# Patient Record
Sex: FEMALE | Race: WHITE | NOT HISPANIC OR LATINO | Employment: FULL TIME | URBAN - NONMETROPOLITAN AREA
[De-identification: names, ages, dates, MRNs, and addresses within clinical notes are randomized per-mention and may not be internally consistent; named-entity substitution may affect disease eponyms.]

---

## 2020-05-18 ENCOUNTER — HOSPITAL ENCOUNTER (EMERGENCY)
Facility: HOSPITAL | Age: 30
Discharge: HOME | End: 2020-05-19
Attending: EMERGENCY MEDICINE | Admitting: EMERGENCY MEDICINE

## 2020-05-18 ENCOUNTER — APPOINTMENT (OUTPATIENT)
Dept: GENERAL RADIOLOGY | Facility: HOSPITAL | Age: 30
End: 2020-05-18

## 2020-05-18 DIAGNOSIS — S63.259A FINGER DISLOCATION, INITIAL ENCOUNTER: Primary | ICD-10-CM

## 2020-05-18 PROCEDURE — 63710000001 ONDANSETRON ODT 4 MG TABLET DISPERSIBLE: Performed by: EMERGENCY MEDICINE

## 2020-05-18 PROCEDURE — 73140 X-RAY EXAM OF FINGER(S): CPT

## 2020-05-18 PROCEDURE — 99283 EMERGENCY DEPT VISIT LOW MDM: CPT

## 2020-05-18 RX ORDER — HYDROCODONE BITARTRATE AND ACETAMINOPHEN 7.5; 325 MG/1; MG/1
1 TABLET ORAL ONCE
Status: COMPLETED | OUTPATIENT
Start: 2020-05-18 | End: 2020-05-18

## 2020-05-18 RX ORDER — ONDANSETRON 4 MG/1
4 TABLET, ORALLY DISINTEGRATING ORAL ONCE
Status: COMPLETED | OUTPATIENT
Start: 2020-05-18 | End: 2020-05-18

## 2020-05-18 RX ADMIN — ONDANSETRON 4 MG: 4 TABLET, ORALLY DISINTEGRATING ORAL at 23:30

## 2020-05-18 RX ADMIN — HYDROCODONE BITARTRATE AND ACETAMINOPHEN 1 TABLET: 7.5; 325 TABLET ORAL at 23:30

## 2020-05-19 ENCOUNTER — APPOINTMENT (OUTPATIENT)
Dept: GENERAL RADIOLOGY | Facility: HOSPITAL | Age: 30
End: 2020-05-19

## 2020-05-19 VITALS
HEIGHT: 64 IN | BODY MASS INDEX: 27.31 KG/M2 | OXYGEN SATURATION: 100 % | TEMPERATURE: 98.6 F | RESPIRATION RATE: 16 BRPM | DIASTOLIC BLOOD PRESSURE: 85 MMHG | SYSTOLIC BLOOD PRESSURE: 121 MMHG | HEART RATE: 82 BPM | WEIGHT: 160 LBS

## 2020-05-19 PROCEDURE — 73140 X-RAY EXAM OF FINGER(S): CPT

## 2020-10-03 ENCOUNTER — HOSPITAL ENCOUNTER (EMERGENCY)
Facility: HOSPITAL | Age: 30
Discharge: HOME | End: 2020-10-03
Attending: FAMILY MEDICINE | Admitting: FAMILY MEDICINE

## 2020-10-03 ENCOUNTER — APPOINTMENT (OUTPATIENT)
Dept: GENERAL RADIOLOGY | Facility: HOSPITAL | Age: 30
End: 2020-10-03

## 2020-10-03 ENCOUNTER — APPOINTMENT (OUTPATIENT)
Dept: CT IMAGING | Facility: HOSPITAL | Age: 30
End: 2020-10-03

## 2020-10-03 VITALS
SYSTOLIC BLOOD PRESSURE: 99 MMHG | HEART RATE: 70 BPM | DIASTOLIC BLOOD PRESSURE: 57 MMHG | WEIGHT: 153 LBS | RESPIRATION RATE: 18 BRPM | OXYGEN SATURATION: 100 % | TEMPERATURE: 98.8 F | BODY MASS INDEX: 26.12 KG/M2 | HEIGHT: 64 IN

## 2020-10-03 DIAGNOSIS — R42 EPISODIC LIGHTHEADEDNESS: Primary | ICD-10-CM

## 2020-10-03 LAB
ALBUMIN SERPL-MCNC: 5.4 G/DL (ref 3.5–5.2)
ALBUMIN/GLOB SERPL: 1.9 G/DL
ALP SERPL-CCNC: 94 U/L (ref 39–117)
ALT SERPL W P-5'-P-CCNC: 10 U/L (ref 1–33)
ANION GAP SERPL CALCULATED.3IONS-SCNC: 12.4 MMOL/L (ref 5–15)
AST SERPL-CCNC: 14 U/L (ref 1–32)
B-HCG UR QL: NEGATIVE
BACTERIA UR QL AUTO: ABNORMAL /HPF
BASOPHILS # BLD AUTO: 0.07 10*3/MM3 (ref 0–0.2)
BASOPHILS NFR BLD AUTO: 0.8 % (ref 0–1.5)
BILIRUB SERPL-MCNC: 0.4 MG/DL (ref 0–1.2)
BILIRUB UR QL STRIP: ABNORMAL
BUN SERPL-MCNC: 10 MG/DL (ref 6–20)
BUN/CREAT SERPL: 11.4 (ref 7–25)
CALCIUM SPEC-SCNC: 10.3 MG/DL (ref 8.6–10.5)
CHLORIDE SERPL-SCNC: 102 MMOL/L (ref 98–107)
CK SERPL-CCNC: 53 U/L (ref 20–180)
CLARITY UR: ABNORMAL
CO2 SERPL-SCNC: 25.6 MMOL/L (ref 22–29)
COLOR UR: ABNORMAL
CREAT SERPL-MCNC: 0.88 MG/DL (ref 0.57–1)
DEPRECATED RDW RBC AUTO: 42.6 FL (ref 37–54)
EOSINOPHIL # BLD AUTO: 0.19 10*3/MM3 (ref 0–0.4)
EOSINOPHIL NFR BLD AUTO: 2.2 % (ref 0.3–6.2)
ERYTHROCYTE [DISTWIDTH] IN BLOOD BY AUTOMATED COUNT: 13.3 % (ref 12.3–15.4)
GFR SERPL CREATININE-BSD FRML MDRD: 75 ML/MIN/1.73
GLOBULIN UR ELPH-MCNC: 2.9 GM/DL
GLUCOSE SERPL-MCNC: 107 MG/DL (ref 65–99)
GLUCOSE UR STRIP-MCNC: NEGATIVE MG/DL
HCT VFR BLD AUTO: 52 % (ref 34–46.6)
HGB BLD-MCNC: 16.8 G/DL (ref 12–15.9)
HGB UR QL STRIP.AUTO: ABNORMAL
HOLD SPECIMEN: NORMAL
HOLD SPECIMEN: NORMAL
HYALINE CASTS UR QL AUTO: ABNORMAL /LPF
IMM GRANULOCYTES # BLD AUTO: 0.03 10*3/MM3 (ref 0–0.05)
IMM GRANULOCYTES NFR BLD AUTO: 0.3 % (ref 0–0.5)
KETONES UR QL STRIP: ABNORMAL
LEUKOCYTE ESTERASE UR QL STRIP.AUTO: ABNORMAL
LIPASE SERPL-CCNC: 46 U/L (ref 13–60)
LYMPHOCYTES # BLD AUTO: 2.51 10*3/MM3 (ref 0.7–3.1)
LYMPHOCYTES NFR BLD AUTO: 28.8 % (ref 19.6–45.3)
MAGNESIUM SERPL-MCNC: 2.3 MG/DL (ref 1.6–2.6)
MCH RBC QN AUTO: 28.1 PG (ref 26.6–33)
MCHC RBC AUTO-ENTMCNC: 32.3 G/DL (ref 31.5–35.7)
MCV RBC AUTO: 87.1 FL (ref 79–97)
MONOCYTES # BLD AUTO: 0.44 10*3/MM3 (ref 0.1–0.9)
MONOCYTES NFR BLD AUTO: 5 % (ref 5–12)
NEUTROPHILS NFR BLD AUTO: 5.48 10*3/MM3 (ref 1.7–7)
NEUTROPHILS NFR BLD AUTO: 62.9 % (ref 42.7–76)
NITRITE UR QL STRIP: NEGATIVE
NRBC BLD AUTO-RTO: 0 /100 WBC (ref 0–0.2)
PH UR STRIP.AUTO: <=5 [PH] (ref 5–8)
PLATELET # BLD AUTO: 261 10*3/MM3 (ref 140–450)
PMV BLD AUTO: 10.5 FL (ref 6–12)
POTASSIUM SERPL-SCNC: 4.1 MMOL/L (ref 3.5–5.2)
PROT SERPL-MCNC: 8.3 G/DL (ref 6–8.5)
PROT UR QL STRIP: ABNORMAL
RBC # BLD AUTO: 5.97 10*6/MM3 (ref 3.77–5.28)
RBC # UR: ABNORMAL /HPF
REF LAB TEST METHOD: ABNORMAL
SODIUM SERPL-SCNC: 140 MMOL/L (ref 136–145)
SP GR UR STRIP: 1.03 (ref 1–1.03)
SQUAMOUS #/AREA URNS HPF: ABNORMAL /HPF
TROPONIN T SERPL-MCNC: <0.01 NG/ML (ref 0–0.03)
TSH SERPL DL<=0.05 MIU/L-ACNC: 1.06 UIU/ML (ref 0.27–4.2)
UROBILINOGEN UR QL STRIP: ABNORMAL
WBC # BLD AUTO: 8.72 10*3/MM3 (ref 3.4–10.8)
WBC UR QL AUTO: ABNORMAL /HPF
WHOLE BLOOD HOLD SPECIMEN: NORMAL
WHOLE BLOOD HOLD SPECIMEN: NORMAL

## 2020-10-03 PROCEDURE — 81025 URINE PREGNANCY TEST: CPT | Performed by: PHYSICIAN ASSISTANT

## 2020-10-03 PROCEDURE — 96374 THER/PROPH/DIAG INJ IV PUSH: CPT

## 2020-10-03 PROCEDURE — 82550 ASSAY OF CK (CPK): CPT | Performed by: PHYSICIAN ASSISTANT

## 2020-10-03 PROCEDURE — 96375 TX/PRO/DX INJ NEW DRUG ADDON: CPT

## 2020-10-03 PROCEDURE — 93005 ELECTROCARDIOGRAM TRACING: CPT | Performed by: PHYSICIAN ASSISTANT

## 2020-10-03 PROCEDURE — 93010 ELECTROCARDIOGRAM REPORT: CPT | Performed by: INTERNAL MEDICINE

## 2020-10-03 PROCEDURE — 70450 CT HEAD/BRAIN W/O DYE: CPT

## 2020-10-03 PROCEDURE — 25010000002 ONDANSETRON PER 1 MG: Performed by: PHYSICIAN ASSISTANT

## 2020-10-03 PROCEDURE — 85025 COMPLETE CBC W/AUTO DIFF WBC: CPT | Performed by: PHYSICIAN ASSISTANT

## 2020-10-03 PROCEDURE — 71045 X-RAY EXAM CHEST 1 VIEW: CPT | Performed by: RADIOLOGY

## 2020-10-03 PROCEDURE — 99285 EMERGENCY DEPT VISIT HI MDM: CPT

## 2020-10-03 PROCEDURE — 81001 URINALYSIS AUTO W/SCOPE: CPT | Performed by: PHYSICIAN ASSISTANT

## 2020-10-03 PROCEDURE — 83735 ASSAY OF MAGNESIUM: CPT | Performed by: PHYSICIAN ASSISTANT

## 2020-10-03 PROCEDURE — 80053 COMPREHEN METABOLIC PANEL: CPT | Performed by: PHYSICIAN ASSISTANT

## 2020-10-03 PROCEDURE — 84484 ASSAY OF TROPONIN QUANT: CPT | Performed by: PHYSICIAN ASSISTANT

## 2020-10-03 PROCEDURE — 71045 X-RAY EXAM CHEST 1 VIEW: CPT

## 2020-10-03 PROCEDURE — 70450 CT HEAD/BRAIN W/O DYE: CPT | Performed by: RADIOLOGY

## 2020-10-03 PROCEDURE — 25010000002 KETOROLAC TROMETHAMINE PER 15 MG: Performed by: PHYSICIAN ASSISTANT

## 2020-10-03 PROCEDURE — 84443 ASSAY THYROID STIM HORMONE: CPT | Performed by: PHYSICIAN ASSISTANT

## 2020-10-03 PROCEDURE — 83690 ASSAY OF LIPASE: CPT | Performed by: PHYSICIAN ASSISTANT

## 2020-10-03 RX ORDER — SODIUM CHLORIDE 0.9 % (FLUSH) 0.9 %
10 SYRINGE (ML) INJECTION AS NEEDED
Status: DISCONTINUED | OUTPATIENT
Start: 2020-10-03 | End: 2020-10-03 | Stop reason: HOSPADM

## 2020-10-03 RX ORDER — KETOROLAC TROMETHAMINE 30 MG/ML
30 INJECTION, SOLUTION INTRAMUSCULAR; INTRAVENOUS ONCE
Status: COMPLETED | OUTPATIENT
Start: 2020-10-03 | End: 2020-10-03

## 2020-10-03 RX ORDER — ONDANSETRON 2 MG/ML
4 INJECTION INTRAMUSCULAR; INTRAVENOUS ONCE
Status: COMPLETED | OUTPATIENT
Start: 2020-10-03 | End: 2020-10-03

## 2020-10-03 RX ADMIN — KETOROLAC TROMETHAMINE 30 MG: 30 INJECTION, SOLUTION INTRAMUSCULAR; INTRAVENOUS at 13:12

## 2020-10-03 RX ADMIN — SODIUM CHLORIDE 1000 ML: 9 INJECTION, SOLUTION INTRAVENOUS at 13:45

## 2020-10-03 RX ADMIN — ONDANSETRON 4 MG: 2 INJECTION INTRAMUSCULAR; INTRAVENOUS at 13:30

## 2020-10-03 RX ADMIN — SODIUM CHLORIDE 1000 ML: 9 INJECTION, SOLUTION INTRAVENOUS at 12:22

## 2020-10-03 NOTE — DISCHARGE INSTRUCTIONS
Rest today, drink plenty of fluids.      Call one of the offices below to establish a primary care provider.  If you are unable to get an appointment and feel it is an emergency and need to be seen immediately please return to the Emergency Department.    Call one of the office below to set up a primary care provider.    Dr. Pedro Berry                                                                                                       [address] [facility] [address]  [phone]    Dr. Jacob, Dr. MARYBEL Beatty, Dr. ASMITA Beatty ([facility])  [address] [facility] [address]  [phone]    Dr. Rider, Dr. Hernandez, Dr. Artis ([facility])  [address] [facility] [address]  [phone]    Dr. Carpenter  [address] [facility] [address]  [phone]    Dr. Tolentino, Dr. Payne, Dr. Blanc, Dr. Rivero ([facility])  [facility] [address]  [facility] [address]  [phone]    Dr. Alida Shanonn  [address] [facility] [address]  [phone]    Dr. Giovanna Swenson  [address]   [address]  [facility] [address]  [phone]    Dr. Berry  [address] [facility] [address]  [phone]    Dr. Phillips, Dr. Parr  [address] [facility] [place] [address]  [phone]    Dr. Mcmahan  [address]                                                              [address]  [facility] [address]  [phone]    Dr. Disla  [address] [facility] [address]  [phone]    Dr. Sally Pires  [address] [facility]  [address]  [place] [address]  [phone]    Dr. Adrian and [facility]   [address] [facility]  [address]  [place] [address]  [phone]

## 2020-10-03 NOTE — ED PROVIDER NOTES
Subjective   30-year-old female who presents to the ED today for dizziness and headache.  She reports that she generally just has not been feeling well.  She had an episode of dizziness, headache with blurred vision about 9 days ago.  This started while she was at work.  She states she went home and laid down and the next day she felt better.  She states she had another episode yesterday.  She states that she began to feel very nauseous and felt like she was going to pass out.  She states she has a pressure sensation in her head.  She states today she is having body aches and sore muscles.  She denies any vertigo symptoms.  She denies any fever.  She states she has been checking her blood sugar at home in case it was related to hypoglycemia and it has been normal.  She denies any cough, chest pain or shortness of breath.  She denies any runny nose, congestion, sore throat or sinus symptoms.  She has not tried any medications for her symptoms.  She denies any known sick contacts.  She denies any recent travel.  She states she is not currently on any daily home medicines.  She does smoke 1 pack of cigarettes per day.  She denies any drug or alcohol use.      History provided by:  Patient  Dizziness  Quality:  Lightheadedness  Severity:  Moderate  Onset quality:  Sudden  Duration:  9 days  Timing:  Intermittent  Progression:  Waxing and waning  Chronicity:  New  Relieved by:  Nothing  Worsened by:  Standing up  Associated symptoms: headaches and nausea    Associated symptoms: no blood in stool, no chest pain, no diarrhea, no hearing loss, no palpitations, no shortness of breath, no syncope, no tinnitus, no vision changes, no vomiting and no weakness        Review of Systems   Constitutional: Negative for appetite change, chills, diaphoresis and fever.   HENT: Negative for congestion, hearing loss and tinnitus.    Eyes: Negative.    Respiratory: Negative for chest tightness and shortness of breath.    Cardiovascular:  Negative for chest pain, palpitations and syncope.   Gastrointestinal: Positive for nausea. Negative for abdominal pain, blood in stool, diarrhea and vomiting.   Genitourinary: Negative.    Musculoskeletal: Positive for myalgias.   Skin: Negative.    Neurological: Positive for dizziness, light-headedness and headaches. Negative for syncope and weakness.   Psychiatric/Behavioral: Negative.    All other systems reviewed and are negative.      No past medical history on file.    Allergies   Allergen Reactions   • Amoxicillin Rash   • Benadryl [Diphenhydramine] Rash   • Penicillins Rash       No past surgical history on file.    No family history on file.    Social History     Socioeconomic History   • Marital status:      Spouse name: Not on file   • Number of children: Not on file   • Years of education: Not on file   • Highest education level: Not on file           Objective   Physical Exam  Vitals signs and nursing note reviewed.   Constitutional:       General: She is not in acute distress.     Appearance: Normal appearance. She is normal weight. She is not ill-appearing.   HENT:      Head: Normocephalic and atraumatic.      Right Ear: External ear normal.      Left Ear: External ear normal.      Nose: Nose normal.      Mouth/Throat:      Mouth: Mucous membranes are moist.      Pharynx: Oropharynx is clear.   Eyes:      Extraocular Movements: Extraocular movements intact.      Conjunctiva/sclera: Conjunctivae normal.      Pupils: Pupils are equal, round, and reactive to light.   Neck:      Musculoskeletal: Normal range of motion and neck supple.   Cardiovascular:      Rate and Rhythm: Normal rate and regular rhythm.      Pulses: Normal pulses.      Heart sounds: Normal heart sounds.   Pulmonary:      Effort: Pulmonary effort is normal. No respiratory distress.      Breath sounds: Normal breath sounds.   Abdominal:      General: Bowel sounds are normal.      Palpations: Abdomen is soft.      Tenderness: There  is no abdominal tenderness.   Musculoskeletal: Normal range of motion.         General: No swelling or tenderness.      Comments: No calf tenderness bilaterally   Skin:     General: Skin is warm and dry.      Capillary Refill: Capillary refill takes less than 2 seconds.   Neurological:      General: No focal deficit present.      Mental Status: She is alert and oriented to person, place, and time.   Psychiatric:         Mood and Affect: Mood normal.         Procedures           ED Course  ED Course as of Oct 03 1600   Sat Oct 03, 2020   1307 FINDINGS:   The lungs remain aerated.  Heart and mediastinum contours are unremarkable.  No pleural effusion.  No pneumothorax.   Bony and soft tissue structures are unremarkable.     IMPRESSION:  No radiographic evidence of acute cardiac or pulmonary  disease.   XR Chest 1 View [AH]   1310 EKG performed at 12:06  Sinus rhythm, rate of 89PR interval 124 ms, QRS duration 86 ms,  ms,  msNo acute ischemia, no evidence of STEMIInterpreted by Dr. Heaton    []   2368 FINDINGS:      Extra axial spaces: Normal in size and morphology for the patient's age.  Hemorrhage: None.  Ventricular system: Normal in size and morphology for the patient's age.  Basal cisterns: Normal.  Cerebral parenchyma: Normal.  Midline shift: None.  Cerebellum: Normal.  Brainstem: Normal.     OTHER:     Calvarium: Normal.  Vascular system: Normal.  Visualized Paranasal sinuses: Clear.  Visualized Orbits: Normal.  Visualized upper cervical spine: Normal.  Sella and skull base: Normal.     IMPRESSION:     No acute intracranial abnormality.   CT Head Without Contrast [AH]   6679 Patient resting comfortably, no acute distress.  She states she is starting to feel better.  She will be able to be discharged when her second liter of fluids is done.  It is currently running.  She was advised to rest today and to drink plenty of fluids and to make sure that she is drinking enough water daily.  She will  follow-up outpatient and will return to the ED if her symptoms change or worsen.    [AH]      ED Course User Index  [AH] Rhina Martin PA                                           MDM  Number of Diagnoses or Management Options  Episodic lightheadedness:      Amount and/or Complexity of Data Reviewed  Clinical lab tests: reviewed  Tests in the radiology section of CPT®: reviewed  Tests in the medicine section of CPT®: reviewed    Patient Progress  Patient progress: improved      Final diagnoses:   Episodic lightheadedness            Rhina Martin PA  10/03/20 1600

## 2020-10-03 NOTE — ED NOTES
Discharge instructions reviewed with patient, patient instructed to return to ED if symptoms worsen or if any new problems arise. Patient verbalizes understanding of discharge instructions, patient ambulatory out of ED. No acute distress noted.       Wendi Burgos RN  10/03/20 1761

## 2020-11-04 ENCOUNTER — HOSPITAL ENCOUNTER (EMERGENCY)
Facility: HOSPITAL | Age: 30
Discharge: HOME | End: 2020-11-04
Attending: EMERGENCY MEDICINE | Admitting: EMERGENCY MEDICINE

## 2020-11-04 VITALS
OXYGEN SATURATION: 98 % | TEMPERATURE: 98.7 F | RESPIRATION RATE: 18 BRPM | SYSTOLIC BLOOD PRESSURE: 112 MMHG | HEIGHT: 64 IN | DIASTOLIC BLOOD PRESSURE: 58 MMHG | BODY MASS INDEX: 26.63 KG/M2 | HEART RATE: 88 BPM | WEIGHT: 156 LBS

## 2020-11-04 DIAGNOSIS — J98.8 VIRAL RESPIRATORY ILLNESS: Primary | ICD-10-CM

## 2020-11-04 DIAGNOSIS — B97.89 VIRAL RESPIRATORY ILLNESS: Primary | ICD-10-CM

## 2020-11-04 LAB
FLUAV AG NPH QL: NEGATIVE
FLUBV AG NPH QL IA: NEGATIVE

## 2020-11-04 PROCEDURE — 25010000002 DEXAMETHASONE PER 1 MG: Performed by: PHYSICIAN ASSISTANT

## 2020-11-04 PROCEDURE — U0004 COV-19 TEST NON-CDC HGH THRU: HCPCS | Performed by: PHYSICIAN ASSISTANT

## 2020-11-04 PROCEDURE — 99283 EMERGENCY DEPT VISIT LOW MDM: CPT

## 2020-11-04 PROCEDURE — C9803 HOPD COVID-19 SPEC COLLECT: HCPCS

## 2020-11-04 PROCEDURE — 87804 INFLUENZA ASSAY W/OPTIC: CPT | Performed by: PHYSICIAN ASSISTANT

## 2020-11-04 PROCEDURE — 96372 THER/PROPH/DIAG INJ SC/IM: CPT

## 2020-11-04 RX ORDER — DEXAMETHASONE SODIUM PHOSPHATE 4 MG/ML
8 INJECTION, SOLUTION INTRA-ARTICULAR; INTRALESIONAL; INTRAMUSCULAR; INTRAVENOUS; SOFT TISSUE ONCE
Status: COMPLETED | OUTPATIENT
Start: 2020-11-04 | End: 2020-11-04

## 2020-11-04 RX ORDER — METHYLPREDNISOLONE 4 MG/1
TABLET ORAL
Qty: 21 TABLET | Refills: 0 | Status: SHIPPED | OUTPATIENT
Start: 2020-11-04 | End: 2023-04-05

## 2020-11-04 RX ADMIN — DEXAMETHASONE SODIUM PHOSPHATE 8 MG: 4 INJECTION, SOLUTION INTRAMUSCULAR; INTRAVENOUS at 16:36

## 2020-11-04 NOTE — ED PROVIDER NOTES
Subjective     History provided by:  Patient  URI  Presenting symptoms: congestion, cough, fatigue, rhinorrhea and sore throat    Presenting symptoms: no fever    Severity:  Moderate  Onset quality:  Gradual  Timing:  Constant  Progression:  Worsening  Chronicity:  Recurrent (pt has been treated for sinus infection, increasing sx. )  Relieved by:  Nothing  Associated symptoms: myalgias and sinus pain        Review of Systems   Constitutional: Positive for fatigue. Negative for fever.   HENT: Positive for congestion, rhinorrhea, sinus pain and sore throat.    Respiratory: Positive for cough.    Cardiovascular: Negative.  Negative for chest pain.   Gastrointestinal: Negative.  Negative for abdominal pain.   Endocrine: Negative.    Genitourinary: Negative.  Negative for dysuria.   Musculoskeletal: Positive for myalgias.   Skin: Negative.    Neurological: Negative.    Psychiatric/Behavioral: Negative.    All other systems reviewed and are negative.      No past medical history on file.    Allergies   Allergen Reactions   • Amoxicillin Rash   • Benadryl [Diphenhydramine] Rash   • Penicillins Rash       No past surgical history on file.    No family history on file.    Social History     Socioeconomic History   • Marital status:      Spouse name: Not on file   • Number of children: Not on file   • Years of education: Not on file   • Highest education level: Not on file           Objective   Physical Exam  Vitals signs and nursing note reviewed.   Constitutional:       General: She is not in acute distress.     Appearance: She is well-developed. She is not diaphoretic.   HENT:      Head: Normocephalic and atraumatic.      Right Ear: External ear normal.      Left Ear: External ear normal.      Nose: Nose normal.   Eyes:      Conjunctiva/sclera: Conjunctivae normal.   Neck:      Musculoskeletal: Normal range of motion and neck supple.      Vascular: No JVD.      Trachea: No tracheal deviation.   Cardiovascular:       Rate and Rhythm: Normal rate and regular rhythm.      Heart sounds: No murmur.   Pulmonary:      Effort: Pulmonary effort is normal. No respiratory distress.      Breath sounds: No wheezing.   Abdominal:      Palpations: Abdomen is soft.      Tenderness: There is no abdominal tenderness.   Musculoskeletal: Normal range of motion.         General: No deformity.   Skin:     General: Skin is warm and dry.      Coloration: Skin is not pale.      Findings: No erythema or rash.   Neurological:      Mental Status: She is alert and oriented to person, place, and time.      Cranial Nerves: No cranial nerve deficit.   Psychiatric:         Behavior: Behavior normal.         Thought Content: Thought content normal.         Procedures           ED Course                                           MDM  Number of Diagnoses or Management Options  Viral respiratory illness: new and requires workup     Amount and/or Complexity of Data Reviewed  Clinical lab tests: ordered and reviewed    Risk of Complications, Morbidity, and/or Mortality  Presenting problems: low  Diagnostic procedures: low  Management options: low    Patient Progress  Patient progress: stable      Final diagnoses:   Viral respiratory illness            Duy Vuong II, PA  11/04/20 0627

## 2020-11-04 NOTE — DISCHARGE INSTRUCTIONS
Call one of the offices below to establish a primary care provider.  If you are unable to get an appointment and feel it is an emergency and need to be seen immediately please return to the Emergency Department.    Call one of the office below to set up a primary care provider.    Dr. Pedro Berry                                                                                                       [address] [facility] [address]  [phone]    Dr. Jacob, Dr. MARYBEL Beatty, Dr. ASMITA Beatty ([facility])  [address] [facility] [address]  [phone]    Dr. Rider, Dr. Hernandez, Dr. Artis ([facility])  [address] [facility] [address]  [phone]    Dr. Carpenter  [address] [facility] [address]  [phone]    Dr. Tolentino, Dr. Payne, Dr. Blanc, Dr. Rivero ([facility])  [address] [place] [address] [address]  [facility] [address]  [phone]    Dr. Alida Shannon  [address] [facility] [place] [address]  [phone]    Dr. Giovanna Swenson  [address]   [address]  [facility] [address]  [phone]    Dr. Berry  [address] [facility] [address]  [phone]    Dr. Phillips, Dr. Parr  [address] [facility] [place] [address]  [phone]    Dr. Mcmahan  [address]                                                              [address]  [facility] [address]  [phone]    Dr. Disla  [address] [facility] [address]  [phone]    Dr. Sally Pires  [address]  [address]  [place] [address]  [phone]    Dr. Adrian and [facility]   [address] [facility]  [address]  [place] [address]  [phone]

## 2020-11-05 LAB — SARS-COV-2 RNA RESP QL NAA+PROBE: NOT DETECTED

## 2020-12-09 ENCOUNTER — TRANSCRIBE ORDERS (OUTPATIENT)
Dept: ADMINISTRATIVE | Facility: HOSPITAL | Age: 30
End: 2020-12-09

## 2020-12-09 ENCOUNTER — HOSPITAL ENCOUNTER (OUTPATIENT)
Dept: GENERAL RADIOLOGY | Facility: HOSPITAL | Age: 30
Discharge: HOME | End: 2020-12-09
Admitting: NURSE PRACTITIONER

## 2020-12-09 DIAGNOSIS — R05.9 COUGH: ICD-10-CM

## 2020-12-09 DIAGNOSIS — R05.9 COUGH: Primary | ICD-10-CM

## 2020-12-09 PROCEDURE — 71046 X-RAY EXAM CHEST 2 VIEWS: CPT | Performed by: RADIOLOGY

## 2020-12-09 PROCEDURE — 71046 X-RAY EXAM CHEST 2 VIEWS: CPT

## 2021-01-06 ENCOUNTER — HOSPITAL ENCOUNTER (EMERGENCY)
Facility: HOSPITAL | Age: 31
Discharge: LEFT BEFORE DISCHARGE | End: 2021-01-06

## 2021-01-06 ENCOUNTER — HOSPITAL ENCOUNTER (EMERGENCY)
Facility: HOSPITAL | Age: 31
Discharge: HOME | End: 2021-01-06
Admitting: STUDENT IN AN ORGANIZED HEALTH CARE EDUCATION/TRAINING PROGRAM

## 2021-01-06 VITALS
HEART RATE: 84 BPM | OXYGEN SATURATION: 99 % | DIASTOLIC BLOOD PRESSURE: 60 MMHG | SYSTOLIC BLOOD PRESSURE: 115 MMHG | HEIGHT: 64 IN | BODY MASS INDEX: 26.46 KG/M2 | WEIGHT: 155 LBS | RESPIRATION RATE: 20 BRPM | TEMPERATURE: 97.5 F

## 2021-01-06 VITALS
HEIGHT: 64 IN | DIASTOLIC BLOOD PRESSURE: 64 MMHG | HEART RATE: 80 BPM | RESPIRATION RATE: 18 BRPM | BODY MASS INDEX: 26.35 KG/M2 | WEIGHT: 154.32 LBS | OXYGEN SATURATION: 98 % | SYSTOLIC BLOOD PRESSURE: 107 MMHG | TEMPERATURE: 98.7 F

## 2021-01-06 DIAGNOSIS — H66.90 ACUTE OTITIS MEDIA, UNSPECIFIED OTITIS MEDIA TYPE: Primary | ICD-10-CM

## 2021-01-06 LAB
FLUAV RNA RESP QL NAA+PROBE: NOT DETECTED
FLUBV RNA RESP QL NAA+PROBE: NOT DETECTED
SARS-COV-2 RNA RESP QL NAA+PROBE: NOT DETECTED

## 2021-01-06 PROCEDURE — 99283 EMERGENCY DEPT VISIT LOW MDM: CPT

## 2021-01-06 PROCEDURE — C9803 HOPD COVID-19 SPEC COLLECT: HCPCS

## 2021-01-06 PROCEDURE — 63710000001 PREDNISONE PER 1 MG: Performed by: EMERGENCY MEDICINE

## 2021-01-06 PROCEDURE — 87636 SARSCOV2 & INF A&B AMP PRB: CPT | Performed by: NURSE PRACTITIONER

## 2021-01-06 PROCEDURE — 99211 OFF/OP EST MAY X REQ PHY/QHP: CPT

## 2021-01-06 RX ORDER — CEFDINIR 300 MG/1
300 CAPSULE ORAL EVERY 12 HOURS SCHEDULED
Status: COMPLETED | OUTPATIENT
Start: 2021-01-06 | End: 2021-01-06

## 2021-01-06 RX ORDER — PREDNISONE 20 MG/1
60 TABLET ORAL DAILY
Qty: 12 TABLET | Refills: 0 | Status: SHIPPED | OUTPATIENT
Start: 2021-01-07 | End: 2021-01-11

## 2021-01-06 RX ORDER — CEFDINIR 300 MG/1
300 CAPSULE ORAL 2 TIMES DAILY
Qty: 20 CAPSULE | Refills: 0 | Status: SHIPPED | OUTPATIENT
Start: 2021-01-06 | End: 2023-04-05

## 2021-01-06 RX ORDER — PREDNISONE 20 MG/1
60 TABLET ORAL ONCE
Status: COMPLETED | OUTPATIENT
Start: 2021-01-06 | End: 2021-01-06

## 2021-01-06 RX ADMIN — PREDNISONE 60 MG: 20 TABLET ORAL at 20:34

## 2021-01-06 RX ADMIN — CEFDINIR 300 MG: 300 CAPSULE ORAL at 20:34

## 2021-01-07 NOTE — ED PROVIDER NOTES
Subjective     URI  Presenting symptoms: congestion    Presenting symptoms comment:  Right earache    Severity:  Moderate  Onset quality:  Sudden  Duration:  1 day  Timing:  Constant  Progression:  Waxing and waning  Chronicity:  New  Relieved by:  Nothing  Worsened by:  Nothing  Ineffective treatments:  None tried  Associated symptoms: sinus pain    Associated symptoms: no arthralgias, no headaches, no myalgias and no neck pain    Associated symptoms comment:  Patient wants tested for COVID  Risk factors: not elderly, no chronic respiratory disease, no recent illness, no recent travel and no sick contacts        Review of Systems   Constitutional: Negative.    HENT: Positive for congestion and sinus pain.    Eyes: Negative.    Respiratory: Negative.    Cardiovascular: Negative.    Gastrointestinal: Negative.    Endocrine: Negative.    Genitourinary: Negative.    Musculoskeletal: Negative.  Negative for arthralgias, myalgias and neck pain.   Skin: Negative.    Allergic/Immunologic: Negative.    Neurological: Negative.  Negative for headaches.   Hematological: Negative.    Psychiatric/Behavioral: Negative.        No past medical history on file.    Allergies   Allergen Reactions   • Amoxicillin Rash   • Benadryl [Diphenhydramine] Rash   • Penicillins Rash       No past surgical history on file.    No family history on file.    Social History     Socioeconomic History   • Marital status:      Spouse name: Not on file   • Number of children: Not on file   • Years of education: Not on file   • Highest education level: Not on file           Objective   Physical Exam  Vitals signs and nursing note reviewed.   Constitutional:       Appearance: She is well-developed.   HENT:      Head: Normocephalic.      Comments: Right external canal swollen, TM erythematous       Left Ear: External ear normal.   Eyes:      Conjunctiva/sclera: Conjunctivae normal.      Pupils: Pupils are equal, round, and reactive to light.   Neck:       Musculoskeletal: Normal range of motion and neck supple.   Cardiovascular:      Rate and Rhythm: Normal rate and regular rhythm.      Heart sounds: Normal heart sounds.   Pulmonary:      Effort: Pulmonary effort is normal.      Breath sounds: Normal breath sounds.   Abdominal:      General: Bowel sounds are normal.      Palpations: Abdomen is soft.   Musculoskeletal: Normal range of motion.   Skin:     General: Skin is warm and dry.      Capillary Refill: Capillary refill takes less than 2 seconds.   Neurological:      Mental Status: She is alert and oriented to person, place, and time.   Psychiatric:         Behavior: Behavior normal.         Thought Content: Thought content normal.         Procedures           ED Course                                           MDM    Final diagnoses:   Acute otitis media, unspecified otitis media type            Jeffry Vuong, APRN  01/06/21 5554

## 2021-03-18 ENCOUNTER — BULK ORDERING (OUTPATIENT)
Dept: CASE MANAGEMENT | Facility: OTHER | Age: 31
End: 2021-03-18

## 2021-03-18 DIAGNOSIS — Z23 IMMUNIZATION DUE: ICD-10-CM

## 2021-12-14 ENCOUNTER — HOSPITAL ENCOUNTER (EMERGENCY)
Facility: HOSPITAL | Age: 31
Discharge: HOME | End: 2021-12-14
Attending: EMERGENCY MEDICINE | Admitting: EMERGENCY MEDICINE

## 2021-12-14 ENCOUNTER — APPOINTMENT (OUTPATIENT)
Dept: ULTRASOUND IMAGING | Facility: HOSPITAL | Age: 31
End: 2021-12-14

## 2021-12-14 VITALS
RESPIRATION RATE: 18 BRPM | TEMPERATURE: 98.3 F | BODY MASS INDEX: 23.9 KG/M2 | OXYGEN SATURATION: 97 % | WEIGHT: 140 LBS | SYSTOLIC BLOOD PRESSURE: 126 MMHG | HEIGHT: 64 IN | DIASTOLIC BLOOD PRESSURE: 84 MMHG | HEART RATE: 88 BPM

## 2021-12-14 DIAGNOSIS — O03.9 SPONTANEOUS ABORTION: Primary | ICD-10-CM

## 2021-12-14 DIAGNOSIS — N39.0 UTI (URINARY TRACT INFECTION), UNCOMPLICATED: ICD-10-CM

## 2021-12-14 DIAGNOSIS — Z31.82 RH INCOMPATIBILITY: ICD-10-CM

## 2021-12-14 LAB
ABO GROUP BLD: NORMAL
ALBUMIN SERPL-MCNC: 4.3 G/DL (ref 3.5–5.2)
ALBUMIN/GLOB SERPL: 1.5 G/DL
ALP SERPL-CCNC: 121 U/L (ref 39–117)
ALT SERPL W P-5'-P-CCNC: 37 U/L (ref 1–33)
ANION GAP SERPL CALCULATED.3IONS-SCNC: 10.9 MMOL/L (ref 5–15)
AST SERPL-CCNC: 20 U/L (ref 1–32)
BACTERIA UR QL AUTO: ABNORMAL /HPF
BASOPHILS # BLD AUTO: 0.08 10*3/MM3 (ref 0–0.2)
BASOPHILS NFR BLD AUTO: 0.7 % (ref 0–1.5)
BILIRUB SERPL-MCNC: <0.2 MG/DL (ref 0–1.2)
BILIRUB UR QL STRIP: NEGATIVE
BLD GP AB SCN SERPL QL: NEGATIVE
BUN SERPL-MCNC: 16 MG/DL (ref 6–20)
BUN/CREAT SERPL: 26.2 (ref 7–25)
CALCIUM SPEC-SCNC: 9.1 MG/DL (ref 8.6–10.5)
CHLORIDE SERPL-SCNC: 104 MMOL/L (ref 98–107)
CLARITY UR: CLEAR
CO2 SERPL-SCNC: 25.1 MMOL/L (ref 22–29)
COLOR UR: YELLOW
CREAT SERPL-MCNC: 0.61 MG/DL (ref 0.57–1)
DEPRECATED RDW RBC AUTO: 44.6 FL (ref 37–54)
EOSINOPHIL # BLD AUTO: 0.26 10*3/MM3 (ref 0–0.4)
EOSINOPHIL NFR BLD AUTO: 2.2 % (ref 0.3–6.2)
ERYTHROCYTE [DISTWIDTH] IN BLOOD BY AUTOMATED COUNT: 13.6 % (ref 12.3–15.4)
GFR SERPL CREATININE-BSD FRML MDRD: 114 ML/MIN/1.73
GLOBULIN UR ELPH-MCNC: 2.8 GM/DL
GLUCOSE SERPL-MCNC: 88 MG/DL (ref 65–99)
GLUCOSE UR STRIP-MCNC: NEGATIVE MG/DL
HCG INTACT+B SERPL-ACNC: <0.1 MIU/ML
HCT VFR BLD AUTO: 44 % (ref 34–46.6)
HGB BLD-MCNC: 13.8 G/DL (ref 12–15.9)
HGB UR QL STRIP.AUTO: ABNORMAL
HOLD SPECIMEN: NORMAL
HOLD SPECIMEN: NORMAL
HYALINE CASTS UR QL AUTO: ABNORMAL /LPF
IMM GRANULOCYTES # BLD AUTO: 0.02 10*3/MM3 (ref 0–0.05)
IMM GRANULOCYTES NFR BLD AUTO: 0.2 % (ref 0–0.5)
KETONES UR QL STRIP: NEGATIVE
LEUKOCYTE ESTERASE UR QL STRIP.AUTO: ABNORMAL
LYMPHOCYTES # BLD AUTO: 2.74 10*3/MM3 (ref 0.7–3.1)
LYMPHOCYTES NFR BLD AUTO: 23.2 % (ref 19.6–45.3)
MCH RBC QN AUTO: 28.2 PG (ref 26.6–33)
MCHC RBC AUTO-ENTMCNC: 31.4 G/DL (ref 31.5–35.7)
MCV RBC AUTO: 89.8 FL (ref 79–97)
MONOCYTES # BLD AUTO: 0.61 10*3/MM3 (ref 0.1–0.9)
MONOCYTES NFR BLD AUTO: 5.2 % (ref 5–12)
NEUTROPHILS NFR BLD AUTO: 68.5 % (ref 42.7–76)
NEUTROPHILS NFR BLD AUTO: 8.09 10*3/MM3 (ref 1.7–7)
NITRITE UR QL STRIP: NEGATIVE
NRBC BLD AUTO-RTO: 0 /100 WBC (ref 0–0.2)
NUMBER OF DOSES: NORMAL
PH UR STRIP.AUTO: 5.5 [PH] (ref 5–8)
PLATELET # BLD AUTO: 325 10*3/MM3 (ref 140–450)
PMV BLD AUTO: 9.8 FL (ref 6–12)
POTASSIUM SERPL-SCNC: 4.1 MMOL/L (ref 3.5–5.2)
PROT SERPL-MCNC: 7.1 G/DL (ref 6–8.5)
PROT UR QL STRIP: NEGATIVE
RBC # BLD AUTO: 4.9 10*6/MM3 (ref 3.77–5.28)
RBC # UR STRIP: ABNORMAL /HPF
REF LAB TEST METHOD: ABNORMAL
RH BLD: NEGATIVE
SODIUM SERPL-SCNC: 140 MMOL/L (ref 136–145)
SP GR UR STRIP: 1.02 (ref 1–1.03)
SQUAMOUS #/AREA URNS HPF: ABNORMAL /HPF
UROBILINOGEN UR QL STRIP: ABNORMAL
WBC # UR STRIP: ABNORMAL /HPF
WBC NRBC COR # BLD: 11.8 10*3/MM3 (ref 3.4–10.8)
WHOLE BLOOD HOLD SPECIMEN: NORMAL
WHOLE BLOOD HOLD SPECIMEN: NORMAL

## 2021-12-14 PROCEDURE — 99283 EMERGENCY DEPT VISIT LOW MDM: CPT

## 2021-12-14 PROCEDURE — 81001 URINALYSIS AUTO W/SCOPE: CPT | Performed by: NURSE PRACTITIONER

## 2021-12-14 PROCEDURE — 36415 COLL VENOUS BLD VENIPUNCTURE: CPT

## 2021-12-14 PROCEDURE — 25010000002 CEFTRIAXONE PER 250 MG: Performed by: PHYSICIAN ASSISTANT

## 2021-12-14 PROCEDURE — 76801 OB US < 14 WKS SINGLE FETUS: CPT

## 2021-12-14 PROCEDURE — 86901 BLOOD TYPING SEROLOGIC RH(D): CPT | Performed by: NURSE PRACTITIONER

## 2021-12-14 PROCEDURE — 86850 RBC ANTIBODY SCREEN: CPT | Performed by: NURSE PRACTITIONER

## 2021-12-14 PROCEDURE — 25010000002 RHO D IMMUNE GLOBULIN 1500 UNITS SOLUTION PREFILLED SYRINGE: Performed by: PHYSICIAN ASSISTANT

## 2021-12-14 PROCEDURE — 84702 CHORIONIC GONADOTROPIN TEST: CPT | Performed by: PHYSICIAN ASSISTANT

## 2021-12-14 PROCEDURE — 96372 THER/PROPH/DIAG INJ SC/IM: CPT

## 2021-12-14 PROCEDURE — 86900 BLOOD TYPING SEROLOGIC ABO: CPT | Performed by: NURSE PRACTITIONER

## 2021-12-14 PROCEDURE — 87086 URINE CULTURE/COLONY COUNT: CPT | Performed by: NURSE PRACTITIONER

## 2021-12-14 PROCEDURE — 85025 COMPLETE CBC W/AUTO DIFF WBC: CPT | Performed by: NURSE PRACTITIONER

## 2021-12-14 PROCEDURE — 80053 COMPREHEN METABOLIC PANEL: CPT | Performed by: NURSE PRACTITIONER

## 2021-12-14 RX ORDER — NITROFURANTOIN 25; 75 MG/1; MG/1
100 CAPSULE ORAL 2 TIMES DAILY
Qty: 10 CAPSULE | Refills: 0 | Status: SHIPPED | OUTPATIENT
Start: 2021-12-14 | End: 2023-04-05

## 2021-12-14 RX ADMIN — LIDOCAINE HYDROCHLORIDE 1 G: 10 INJECTION, SOLUTION EPIDURAL; INFILTRATION; INTRACAUDAL; PERINEURAL at 21:24

## 2021-12-14 RX ADMIN — HUMAN RHO(D) IMMUNE GLOBULIN 300 MCG: 300 INJECTION, SOLUTION INTRAMUSCULAR at 21:17

## 2021-12-14 NOTE — ED NOTES
MEDICAL SCREENING:    Reason for Visit: Vaginal bleeding, 11 weeks pregnant    Patient initially seen in triage.  The patient was advised further evaluation and diagnostic testing will be needed, some of the treatment and testing will be initiated in the lobby in order to begin the process.  The patient will be returned to the waiting area for the time being and possibly be re-assessed by a subsequent ED provider.  The patient will be brought back to the treatment area in as timely manner as possible.       Anabella Zayas, APRN  12/14/21 1804

## 2021-12-14 NOTE — ED PROVIDER NOTES
Subjective   31 year old female with past medical hx of miscarriage (2012) presents to the ED with complaints of vaginal bleeding for the past 3 days. Patient states she is 11 weeks gestation and has no received any prenatal care. She states she just recently moved here for [place] and has not established care with a OB yet. She states her bleeding is almost like a light menstrual cycle and continues to spot. She does endorse mid lower abdominal cramping. Denies any vaginal discharge, fever, or sick contacts. Denies any aggravating or alleviating factors of the cramping or bleeding. Denies any other complaints or concerns at this time.      History provided by:  Patient   used: No        Review of Systems   Constitutional: Negative.  Negative for fever.   HENT: Negative.    Respiratory: Negative.    Cardiovascular: Negative.  Negative for chest pain.   Gastrointestinal: Negative.  Negative for abdominal pain.   Endocrine: Negative.    Genitourinary: Positive for vaginal bleeding. Negative for dysuria.   Skin: Negative.    Neurological: Negative.    Psychiatric/Behavioral: Negative.    All other systems reviewed and are negative.      No past medical history on file.    Allergies   Allergen Reactions   • Amoxicillin Rash   • Benadryl [Diphenhydramine] Rash   • Penicillins Rash       No past surgical history on file.    No family history on file.    Social History     Socioeconomic History   • Marital status: Legally            Objective   Physical Exam  Vitals and nursing note reviewed.   Constitutional:       General: She is not in acute distress.     Appearance: She is well-developed. She is not diaphoretic.   HENT:      Head: Normocephalic and atraumatic.      Right Ear: External ear normal.      Left Ear: External ear normal.      Nose: Nose normal.   Eyes:      Conjunctiva/sclera: Conjunctivae normal.      Pupils: Pupils are equal, round, and reactive to light.   Neck:      Vascular:  No JVD.      Trachea: No tracheal deviation.   Cardiovascular:      Rate and Rhythm: Normal rate and regular rhythm.      Heart sounds: Normal heart sounds. No murmur heard.      Pulmonary:      Effort: Pulmonary effort is normal. No respiratory distress.      Breath sounds: Normal breath sounds. No wheezing.   Abdominal:      General: Bowel sounds are normal. There is no distension.      Palpations: Abdomen is soft.      Tenderness: There is abdominal tenderness in the suprapubic area.   Musculoskeletal:         General: No deformity. Normal range of motion.      Cervical back: Normal range of motion and neck supple.   Skin:     General: Skin is warm and dry.      Coloration: Skin is not pale.      Findings: No erythema or rash.   Neurological:      Mental Status: She is alert and oriented to person, place, and time.      Cranial Nerves: No cranial nerve deficit.   Psychiatric:         Behavior: Behavior normal.         Thought Content: Thought content normal.         Procedures           ED Course  ED Course as of 21   e Dec 14, 2021   1943  Ob < 14 Weeks Single or First Gestation [TK]      ED Course User Index  [TK] Caitie Bryson PA-C                                                 MDM  Number of Diagnoses or Management Options  Rh incompatibility: new and does not require workup  Spontaneous : new and requires workup  UTI (urinary tract infection), uncomplicated: new and requires workup     Amount and/or Complexity of Data Reviewed  Clinical lab tests: reviewed and ordered  Tests in the radiology section of CPT®: reviewed and ordered    Risk of Complications, Morbidity, and/or Mortality  Presenting problems: moderate  Diagnostic procedures: moderate  Management options: moderate    Patient Progress  Patient progress: stable      Final diagnoses:   Spontaneous    Rh incompatibility   UTI (urinary tract infection), uncomplicated       ED Disposition  ED Disposition     ED  Disposition Condition Comment    Discharge Stable           Chandu Locke, DO  [address] [facility]  [address]  [facility] [address]  [phone]    In 2 days           Medication List      New Prescriptions    nitrofurantoin (macrocrystal-monohydrate) 100 MG capsule  Commonly known as: MACROBID  Take 1 capsule by mouth 2 (Two) Times a Day.           Where to Get Your Medications      These medications were sent to [facility] [address] [place] [address], [place] - [address] [facility] - [phone]  - [phone]   [address] [facility] [address]    Phone: [phone]   · nitrofurantoin (macrocrystal-monohydrate) 100 MG capsule          Caitie Bryson PASTEVE  12/14/21 0464

## 2021-12-15 LAB — BACTERIA SPEC AEROBE CULT: NO GROWTH

## 2021-12-30 ENCOUNTER — HOSPITAL ENCOUNTER (EMERGENCY)
Facility: HOSPITAL | Age: 31
Discharge: LEFT BEFORE DISCHARGE | End: 2021-12-30
Admitting: STUDENT IN AN ORGANIZED HEALTH CARE EDUCATION/TRAINING PROGRAM

## 2021-12-30 VITALS
SYSTOLIC BLOOD PRESSURE: 119 MMHG | BODY MASS INDEX: 25.61 KG/M2 | HEIGHT: 64 IN | WEIGHT: 150 LBS | OXYGEN SATURATION: 99 % | DIASTOLIC BLOOD PRESSURE: 67 MMHG | TEMPERATURE: 97.2 F | HEART RATE: 100 BPM | RESPIRATION RATE: 18 BRPM

## 2021-12-30 PROCEDURE — 99281 EMR DPT VST MAYX REQ PHY/QHP: CPT

## 2022-02-19 ENCOUNTER — HOSPITAL ENCOUNTER (EMERGENCY)
Facility: HOSPITAL | Age: 32
Discharge: HOME | End: 2022-02-19
Attending: EMERGENCY MEDICINE | Admitting: EMERGENCY MEDICINE

## 2022-02-19 VITALS
WEIGHT: 158 LBS | HEIGHT: 64 IN | HEART RATE: 86 BPM | DIASTOLIC BLOOD PRESSURE: 56 MMHG | OXYGEN SATURATION: 99 % | SYSTOLIC BLOOD PRESSURE: 116 MMHG | TEMPERATURE: 97.8 F | BODY MASS INDEX: 26.98 KG/M2 | RESPIRATION RATE: 20 BRPM

## 2022-02-19 DIAGNOSIS — L02.91 ABSCESS: Primary | ICD-10-CM

## 2022-02-19 PROCEDURE — 99282 EMERGENCY DEPT VISIT SF MDM: CPT

## 2022-02-19 RX ORDER — LIDOCAINE HYDROCHLORIDE 10 MG/ML
10 INJECTION, SOLUTION EPIDURAL; INFILTRATION; INTRACAUDAL; PERINEURAL ONCE
Status: COMPLETED | OUTPATIENT
Start: 2022-02-19 | End: 2022-02-19

## 2022-02-19 RX ORDER — IBUPROFEN 800 MG/1
800 TABLET ORAL EVERY 6 HOURS PRN
Qty: 30 TABLET | Refills: 0 | Status: SHIPPED | OUTPATIENT
Start: 2022-02-19

## 2022-02-19 RX ORDER — SULFAMETHOXAZOLE AND TRIMETHOPRIM 800; 160 MG/1; MG/1
2 TABLET ORAL 2 TIMES DAILY
Qty: 56 TABLET | Refills: 0 | Status: SHIPPED | OUTPATIENT
Start: 2022-02-19 | End: 2022-03-05

## 2022-02-19 RX ADMIN — LIDOCAINE HYDROCHLORIDE 10 ML: 10 INJECTION, SOLUTION EPIDURAL; INFILTRATION; INTRACAUDAL; PERINEURAL at 17:59

## 2022-02-19 NOTE — ED PROVIDER NOTES
Subjective     History provided by:  Patient   used: No    Abscess  Location:  Leg  Leg abscess location:  L upper leg  Abscess quality: fluctuance, induration, painful, redness and warmth    Duration:  2 weeks  Progression:  Worsening  Chronicity:  New  Relieved by:  Nothing  Worsened by:  Nothing  Ineffective treatments:  None tried      Review of Systems   Constitutional: Negative.    HENT: Negative.    Eyes: Negative.    Respiratory: Negative.    Cardiovascular: Negative.    Gastrointestinal: Negative.    Endocrine: Negative.    Genitourinary: Negative.    Musculoskeletal: Negative.    Skin: Positive for wound.   Allergic/Immunologic: Negative.    Neurological: Negative.    Hematological: Negative.    Psychiatric/Behavioral: Negative.    All other systems reviewed and are negative.      No past medical history on file.    Allergies   Allergen Reactions   • Amoxicillin Rash   • Benadryl [Diphenhydramine] Rash   • Penicillins Rash       No past surgical history on file.    No family history on file.    Social History     Socioeconomic History   • Marital status: Legally            Objective   Physical Exam  Vitals and nursing note reviewed.   Constitutional:       Appearance: Normal appearance. She is normal weight.   HENT:      Head: Normocephalic and atraumatic.      Right Ear: External ear normal.      Left Ear: External ear normal.      Nose: Nose normal.      Mouth/Throat:      Mouth: Mucous membranes are moist.      Pharynx: Oropharynx is clear.   Eyes:      Extraocular Movements: Extraocular movements intact.      Conjunctiva/sclera: Conjunctivae normal.      Pupils: Pupils are equal, round, and reactive to light.   Cardiovascular:      Rate and Rhythm: Normal rate and regular rhythm.      Pulses: Normal pulses.      Heart sounds: Normal heart sounds.   Pulmonary:      Effort: Pulmonary effort is normal.      Breath sounds: Normal breath sounds.   Abdominal:      General:  Abdomen is flat. Bowel sounds are normal.      Palpations: Abdomen is soft.   Musculoskeletal:         General: Normal range of motion.      Cervical back: Normal range of motion and neck supple.   Skin:     General: Skin is warm and dry.      Capillary Refill: Capillary refill takes less than 2 seconds.          Neurological:      General: No focal deficit present.      Mental Status: She is alert and oriented to person, place, and time. Mental status is at baseline.   Psychiatric:         Mood and Affect: Mood normal.         Behavior: Behavior normal.         Thought Content: Thought content normal.         Judgment: Judgment normal.         Procedures           ED Course  ED Course as of 02/19/22 1817   Sat Feb 19, 2022 1815 Attempted to perform I&D on the patient. I was using local anesthetic with lidocaine 1% wo Epi. Pt smacked my arm and told me to quit she could not tolerate the procedure any longer and requested to just be placed on antibiotics.  [ML]      ED Course User Index  [ML] Lizzie Carmichael PA                                                 MDM  Number of Diagnoses or Management Options  Risk of Complications, Morbidity, and/or Mortality  Presenting problems: low  Diagnostic procedures: low  Management options: low    Patient Progress  Patient progress: improved      Final diagnoses:   Abscess       ED Disposition  ED Disposition     ED Disposition Condition Comment    Discharge Stable           Reji Monteroya, APRN  [address] [facility] [address]  [place] [id number]  [phone]    In 1 day      Monique Graves MD  [address] [place] [address] [address]  [facility] [address]  [phone]    Schedule an appointment as soon as possible for a visit in 1 day           Medication List      New Prescriptions    ibuprofen 800 MG tablet  Commonly known as: ADVIL,MOTRIN  Take 1 tablet by mouth Every 6 (Six) Hours As Needed for Moderate Pain .     sulfamethoxazole-trimethoprim 800-160 MG per tablet  Commonly  known as: BACTRIM DS,SEPTRA DS  Take 2 tablets by mouth 2 (Two) Times a Day for 14 days.           Where to Get Your Medications      These medications were sent to [facility] [address] [place] [address], [place] - [address] [facility] - [phone]  - [phone]   [address] [facility] [address]    Phone: [phone]   · ibuprofen 800 MG tablet  · sulfamethoxazole-trimethoprim 800-160 MG per tablet          Lizzie Carmichael PA  02/19/22 5159

## 2022-02-23 ENCOUNTER — HOSPITAL ENCOUNTER (EMERGENCY)
Facility: HOSPITAL | Age: 32
Discharge: HOME | End: 2022-02-23
Attending: STUDENT IN AN ORGANIZED HEALTH CARE EDUCATION/TRAINING PROGRAM | Admitting: STUDENT IN AN ORGANIZED HEALTH CARE EDUCATION/TRAINING PROGRAM

## 2022-02-23 VITALS
RESPIRATION RATE: 18 BRPM | BODY MASS INDEX: 26.98 KG/M2 | SYSTOLIC BLOOD PRESSURE: 112 MMHG | HEART RATE: 104 BPM | HEIGHT: 64 IN | DIASTOLIC BLOOD PRESSURE: 78 MMHG | OXYGEN SATURATION: 99 % | WEIGHT: 158 LBS | TEMPERATURE: 97 F

## 2022-02-23 DIAGNOSIS — K02.9 PAIN DUE TO DENTAL CARIES: ICD-10-CM

## 2022-02-23 DIAGNOSIS — L02.416 ABSCESS OF LEFT THIGH: Primary | ICD-10-CM

## 2022-02-23 PROCEDURE — 96372 THER/PROPH/DIAG INJ SC/IM: CPT

## 2022-02-23 PROCEDURE — 99283 EMERGENCY DEPT VISIT LOW MDM: CPT

## 2022-02-23 PROCEDURE — 25010000002 CEFTRIAXONE PER 250 MG: Performed by: PHYSICIAN ASSISTANT

## 2022-02-23 RX ORDER — ACETAMINOPHEN AND CODEINE PHOSPHATE 300; 30 MG/1; MG/1
1 TABLET ORAL EVERY 6 HOURS PRN
Qty: 10 TABLET | Refills: 0 | Status: SHIPPED | OUTPATIENT
Start: 2022-02-23 | End: 2023-04-05

## 2022-02-23 RX ORDER — HYDROCODONE BITARTRATE AND ACETAMINOPHEN 5; 325 MG/1; MG/1
1 TABLET ORAL ONCE
Status: COMPLETED | OUTPATIENT
Start: 2022-02-23 | End: 2022-02-23

## 2022-02-23 RX ADMIN — BENZOCAINE: 200 LIQUID DENTAL; ORAL; PERIODONTAL at 03:26

## 2022-02-23 RX ADMIN — CEFTRIAXONE 1 G: 1 INJECTION, POWDER, FOR SOLUTION INTRAMUSCULAR; INTRAVENOUS at 04:34

## 2022-02-23 RX ADMIN — HYDROCODONE BITARTRATE AND ACETAMINOPHEN 1 TABLET: 5; 325 TABLET ORAL at 03:26

## 2022-02-23 NOTE — ED PROVIDER NOTES
Subjective   31-year-old female presents the ER with complaints of dental pain and abscess.  Patient states she was recently in [facility] nursing home and developed an abscess soon after.  Patient was seen at the ER few days prior and was not able to tolerate the I&D.  Patient was started on Bactrim in which she has been compliant with.  Patient states that over the last few days her dental pain has become persistently worse.          Review of Systems   Constitutional: Negative.  Negative for fever.   HENT: Positive for dental problem.    Respiratory: Negative.    Cardiovascular: Negative.  Negative for chest pain.   Gastrointestinal: Negative.  Negative for abdominal pain.   Endocrine: Negative.    Genitourinary: Negative.  Negative for dysuria.   Skin: Positive for color change and wound.   Neurological: Negative.    Psychiatric/Behavioral: Negative.    All other systems reviewed and are negative.      No past medical history on file.    Allergies   Allergen Reactions   • Amoxicillin Rash   • Benadryl [Diphenhydramine] Rash   • Penicillins Rash       No past surgical history on file.    No family history on file.    Social History     Socioeconomic History   • Marital status: Legally            Objective   Physical Exam  Vitals and nursing note reviewed.   Constitutional:       General: She is not in acute distress.     Appearance: She is well-developed. She is not diaphoretic.   HENT:      Head: Normocephalic and atraumatic.      Right Ear: External ear normal.      Left Ear: External ear normal.      Nose: Nose normal.      Mouth/Throat:      Comments: Multiple dental caries.  Severe periodontal disease.  Eyes:      Conjunctiva/sclera: Conjunctivae normal.   Neck:      Vascular: No JVD.      Trachea: No tracheal deviation.   Cardiovascular:      Rate and Rhythm: Tachycardia present.      Heart sounds: No murmur heard.      Pulmonary:      Effort: Pulmonary effort is normal. No respiratory distress.      Breath  sounds: No wheezing.   Abdominal:      Palpations: Abdomen is soft.      Tenderness: There is no abdominal tenderness.   Musculoskeletal:         General: No deformity. Normal range of motion.      Cervical back: Normal range of motion and neck supple.   Skin:     Coloration: Skin is not pale.      Findings: Erythema present. No rash.      Comments: Nonfluctuant abscess to the left posterior thigh.  There is some erythema however this appears to be healing and responding appropriately to the antibiotics that were prescribed.   Neurological:      Mental Status: She is alert and oriented to person, place, and time.      Cranial Nerves: No cranial nerve deficit.   Psychiatric:         Behavior: Behavior normal.         Thought Content: Thought content normal.         Procedures           ED Course                                                 MDM  Number of Diagnoses or Management Options  Abscess of left thigh: established and improving  Pain due to dental caries: new and does not require workup  Risk of Complications, Morbidity, and/or Mortality  Presenting problems: low  Diagnostic procedures: low  Management options: low    Patient Progress  Patient progress: stable      Final diagnoses:   Abscess of left thigh   Pain due to dental caries       ED Disposition  ED Disposition     ED Disposition Condition Comment    Discharge Stable            DENTAL CLINIC  [address] [place] [id number]  [phone]  Schedule an appointment as soon as possible for a visit       PATIENT CONNECTION - [place]  See Provider List  [facility] [address]  [phone]  Schedule an appointment as soon as possible for a visit            Medication List      No changes were made to your prescriptions during this visit.          Duy Vuong II, PA  02/23/22 0414

## 2023-03-13 ENCOUNTER — APPOINTMENT (OUTPATIENT)
Dept: GENERAL RADIOLOGY | Facility: HOSPITAL | Age: 33
End: 2023-03-13
Payer: COMMERCIAL

## 2023-03-13 ENCOUNTER — HOSPITAL ENCOUNTER (EMERGENCY)
Facility: HOSPITAL | Age: 33
Discharge: HOME | End: 2023-03-13
Attending: STUDENT IN AN ORGANIZED HEALTH CARE EDUCATION/TRAINING PROGRAM | Admitting: STUDENT IN AN ORGANIZED HEALTH CARE EDUCATION/TRAINING PROGRAM
Payer: COMMERCIAL

## 2023-03-13 VITALS
OXYGEN SATURATION: 99 % | TEMPERATURE: 97.3 F | HEIGHT: 64 IN | SYSTOLIC BLOOD PRESSURE: 105 MMHG | WEIGHT: 135 LBS | HEART RATE: 78 BPM | BODY MASS INDEX: 23.05 KG/M2 | RESPIRATION RATE: 18 BRPM | DIASTOLIC BLOOD PRESSURE: 76 MMHG

## 2023-03-13 DIAGNOSIS — R07.9 NONSPECIFIC CHEST PAIN: Primary | ICD-10-CM

## 2023-03-13 LAB
ALBUMIN SERPL-MCNC: 4.7 G/DL (ref 3.5–5.2)
ALBUMIN/GLOB SERPL: 1.6 G/DL
ALP SERPL-CCNC: 72 U/L (ref 39–117)
ALT SERPL W P-5'-P-CCNC: 10 U/L (ref 1–33)
AMPHET+METHAMPHET UR QL: NEGATIVE
AMPHETAMINES UR QL: NEGATIVE
ANION GAP SERPL CALCULATED.3IONS-SCNC: 9.6 MMOL/L (ref 5–15)
AST SERPL-CCNC: 12 U/L (ref 1–32)
B-HCG UR QL: NEGATIVE
BARBITURATES UR QL SCN: NEGATIVE
BASOPHILS # BLD AUTO: 0.07 10*3/MM3 (ref 0–0.2)
BASOPHILS NFR BLD AUTO: 0.7 % (ref 0–1.5)
BENZODIAZ UR QL SCN: NEGATIVE
BILIRUB SERPL-MCNC: 0.4 MG/DL (ref 0–1.2)
BUN SERPL-MCNC: 15 MG/DL (ref 6–20)
BUN/CREAT SERPL: 21.4 (ref 7–25)
BUPRENORPHINE SERPL-MCNC: NEGATIVE NG/ML
CALCIUM SPEC-SCNC: 9.7 MG/DL (ref 8.6–10.5)
CANNABINOIDS SERPL QL: POSITIVE
CHLORIDE SERPL-SCNC: 105 MMOL/L (ref 98–107)
CO2 SERPL-SCNC: 23.4 MMOL/L (ref 22–29)
COCAINE UR QL: NEGATIVE
CREAT SERPL-MCNC: 0.7 MG/DL (ref 0.57–1)
D DIMER PPP FEU-MCNC: <0.27 MCGFEU/ML (ref 0–0.5)
DEPRECATED RDW RBC AUTO: 46.5 FL (ref 37–54)
EGFRCR SERPLBLD CKD-EPI 2021: 118 ML/MIN/1.73
EOSINOPHIL # BLD AUTO: 0.06 10*3/MM3 (ref 0–0.4)
EOSINOPHIL NFR BLD AUTO: 0.6 % (ref 0.3–6.2)
ERYTHROCYTE [DISTWIDTH] IN BLOOD BY AUTOMATED COUNT: 14.5 % (ref 12.3–15.4)
GEN 5 2HR TROPONIN T REFLEX: <6 NG/L
GLOBULIN UR ELPH-MCNC: 2.9 GM/DL
GLUCOSE SERPL-MCNC: 75 MG/DL (ref 65–99)
HCT VFR BLD AUTO: 47.2 % (ref 34–46.6)
HGB BLD-MCNC: 15.4 G/DL (ref 12–15.9)
HOLD SPECIMEN: NORMAL
HOLD SPECIMEN: NORMAL
IMM GRANULOCYTES # BLD AUTO: 0.02 10*3/MM3 (ref 0–0.05)
IMM GRANULOCYTES NFR BLD AUTO: 0.2 % (ref 0–0.5)
LYMPHOCYTES # BLD AUTO: 2.54 10*3/MM3 (ref 0.7–3.1)
LYMPHOCYTES NFR BLD AUTO: 25.9 % (ref 19.6–45.3)
MCH RBC QN AUTO: 28.9 PG (ref 26.6–33)
MCHC RBC AUTO-ENTMCNC: 32.6 G/DL (ref 31.5–35.7)
MCV RBC AUTO: 88.6 FL (ref 79–97)
METHADONE UR QL SCN: NEGATIVE
MONOCYTES # BLD AUTO: 0.47 10*3/MM3 (ref 0.1–0.9)
MONOCYTES NFR BLD AUTO: 4.8 % (ref 5–12)
NEUTROPHILS NFR BLD AUTO: 6.63 10*3/MM3 (ref 1.7–7)
NEUTROPHILS NFR BLD AUTO: 67.8 % (ref 42.7–76)
NRBC BLD AUTO-RTO: 0 /100 WBC (ref 0–0.2)
OPIATES UR QL: NEGATIVE
OXYCODONE UR QL SCN: NEGATIVE
PCP UR QL SCN: NEGATIVE
PLATELET # BLD AUTO: 255 10*3/MM3 (ref 140–450)
PMV BLD AUTO: 10.4 FL (ref 6–12)
POTASSIUM SERPL-SCNC: 3.9 MMOL/L (ref 3.5–5.2)
PROPOXYPH UR QL: NEGATIVE
PROT SERPL-MCNC: 7.6 G/DL (ref 6–8.5)
QT INTERVAL: 346 MS
QTC INTERVAL: 425 MS
RBC # BLD AUTO: 5.33 10*6/MM3 (ref 3.77–5.28)
SODIUM SERPL-SCNC: 138 MMOL/L (ref 136–145)
TRICYCLICS UR QL SCN: NEGATIVE
TROPONIN T DELTA: NORMAL
TROPONIN T SERPL HS-MCNC: <6 NG/L
WBC NRBC COR # BLD: 9.79 10*3/MM3 (ref 3.4–10.8)
WHOLE BLOOD HOLD COAG: NORMAL
WHOLE BLOOD HOLD SPECIMEN: NORMAL

## 2023-03-13 PROCEDURE — 99283 EMERGENCY DEPT VISIT LOW MDM: CPT

## 2023-03-13 PROCEDURE — 85379 FIBRIN DEGRADATION QUANT: CPT | Performed by: STUDENT IN AN ORGANIZED HEALTH CARE EDUCATION/TRAINING PROGRAM

## 2023-03-13 PROCEDURE — 93005 ELECTROCARDIOGRAM TRACING: CPT | Performed by: PHYSICIAN ASSISTANT

## 2023-03-13 PROCEDURE — 71045 X-RAY EXAM CHEST 1 VIEW: CPT

## 2023-03-13 PROCEDURE — 81025 URINE PREGNANCY TEST: CPT | Performed by: PHYSICIAN ASSISTANT

## 2023-03-13 PROCEDURE — 80306 DRUG TEST PRSMV INSTRMNT: CPT | Performed by: PHYSICIAN ASSISTANT

## 2023-03-13 PROCEDURE — 36415 COLL VENOUS BLD VENIPUNCTURE: CPT

## 2023-03-13 PROCEDURE — 80053 COMPREHEN METABOLIC PANEL: CPT | Performed by: PHYSICIAN ASSISTANT

## 2023-03-13 PROCEDURE — 85025 COMPLETE CBC W/AUTO DIFF WBC: CPT | Performed by: PHYSICIAN ASSISTANT

## 2023-03-13 PROCEDURE — 84484 ASSAY OF TROPONIN QUANT: CPT | Performed by: PHYSICIAN ASSISTANT

## 2023-03-13 RX ORDER — ASPIRIN 81 MG/1
324 TABLET, CHEWABLE ORAL ONCE
Status: COMPLETED | OUTPATIENT
Start: 2023-03-13 | End: 2023-03-13

## 2023-03-13 RX ORDER — SODIUM CHLORIDE 0.9 % (FLUSH) 0.9 %
10 SYRINGE (ML) INJECTION AS NEEDED
Status: DISCONTINUED | OUTPATIENT
Start: 2023-03-13 | End: 2023-03-14 | Stop reason: HOSPADM

## 2023-03-13 RX ADMIN — ASPIRIN 324 MG: 81 TABLET, CHEWABLE ORAL at 21:00

## 2023-03-13 NOTE — ED NOTES
MEDICAL SCREENING:    Reason for Visit: CP, hx of heart disease     Patient initially seen in triage.  The patient was advised further evaluation and diagnostic testing will be needed, some of the treatment and testing will be initiated in the lobby in order to begin the process.  The patient will be returned to the waiting area for the time being and possibly be re-assessed by a subsequent ED provider.  The patient will be brought back to the treatment area in as timely manner as possible.         Lizzie Carmichael PA  03/13/23 9229

## 2023-03-13 NOTE — ED NOTES
Patient waiting in ER lobby. Patient reports no change in condition. Pt explained wait for bed and that she will be brought back in as timely of a manner as possible. DEVORAH

## 2023-03-14 NOTE — DISCHARGE INSTRUCTIONS
May try nonsteroidal anti-inflammatories and monitor for improvement.  Please follow-up with primary care soon as possible and return here for any new onset or worsening symptoms.

## 2023-03-14 NOTE — ED PROVIDER NOTES
"  [facility]  emergency department encounter        Pt Name: Michelle Hernandez  MRN: [id number]  Birthdate 1990  Date of evaluation: 3/13/2023    Chief Complaint   Patient presents with   • Chest Pain             HISTORY OF PRESENT ILLNESS:   Michelle Hernandez is a 32 y.o. female presents with predominant complaint of chest pain.  Patient reports onset last night possibly related to significant life stressors.  Pain is radiated through the back today.  She has had nausea but no vomiting diaphoresis shortness of breath.  No reported exertional worsening.  Exacerbated with deep respiration.  No lower extremity swelling.      PCP: Provider, No Known          REVIEW OF SYSTEMS:     Review of Systems   Constitutional: Negative for fever.   HENT: Negative for congestion and rhinorrhea.    Eyes: Negative for visual disturbance.   Respiratory: Negative for cough and shortness of breath.    Cardiovascular: Positive for chest pain.   Gastrointestinal: Positive for nausea. Negative for abdominal pain and vomiting.   Genitourinary: Negative for dysuria.   Musculoskeletal: Positive for back pain. Negative for myalgias.   Skin: Negative for rash.   Neurological: Negative for headaches.   Psychiatric/Behavioral: Negative for confusion.       Review of systems otherwise as per HPI.          PREVIOUS HISTORY:       No past medical history on file.      No past surgical history on file.        Social History     Socioeconomic History   • Marital status:          No family history on file.      No current outpatient medications      Allergies:  Amoxicillin and Benadryl [diphenhydramine]          PHYSICAL EXAM:     Patient Vitals for the past 24 hrs:   BP Temp Temp src Pulse Resp SpO2 Height Weight   03/13/23 1644 124/69 97.3 °F (36.3 °C) Infrared 106 18 97 % 162.6 cm (64\") 61.2 kg (135 lb)       Physical Exam  Vitals and nursing note reviewed.   Constitutional:       General: She is not in acute distress.  HENT:      " Head: Normocephalic and atraumatic.   Eyes:      Extraocular Movements: Extraocular movements intact.      Conjunctiva/sclera: Conjunctivae normal.   Pulmonary:      Effort: Pulmonary effort is normal. No respiratory distress.   Abdominal:      General: Abdomen is flat. There is no distension.   Musculoskeletal:         General: No deformity. Normal range of motion.      Cervical back: Normal range of motion. No rigidity.   Skin:     Coloration: Skin is not jaundiced.      Findings: No rash.   Neurological:      General: No focal deficit present.      Mental Status: She is alert and oriented to person, place, and time.   Psychiatric:         Mood and Affect: Mood normal.         Behavior: Behavior normal.             COMPLETED DIAGNOSTIC STUDIES AND INTERVENTIONS:     Lab Results (last 24 hours)     Procedure Component Value Units Date/Time    Comprehensive Metabolic Panel [[id number]] Collected: 03/13/23 1839    Specimen: Blood Updated: 03/13/23 1909     Glucose 75 mg/dL      BUN 15 mg/dL      Creatinine 0.70 mg/dL      Sodium 138 mmol/L      Potassium 3.9 mmol/L      Chloride 105 mmol/L      CO2 23.4 mmol/L      Calcium 9.7 mg/dL      Total Protein 7.6 g/dL      Albumin 4.7 g/dL      ALT (SGPT) 10 U/L      AST (SGOT) 12 U/L      Alkaline Phosphatase 72 U/L      Total Bilirubin 0.4 mg/dL      Globulin 2.9 gm/dL      A/G Ratio 1.6 g/dL      BUN/Creatinine Ratio 21.4     Anion Gap 9.6 mmol/L      eGFR 118.0 mL/min/1.73     Narrative:      GFR Normal >60  Chronic Kidney Disease <60  Kidney Failure <15      High Sensitivity Troponin T [[id number]]  (Normal) Collected: 03/13/23 1839    Specimen: Blood Updated: 03/13/23 1909     HS Troponin T <6 ng/L     Narrative:      High Sensitive Troponin T Reference Range:  <10.0 ng/L- Negative Female for AMI  <15.0 ng/L- Negative Male for AMI  >=10 - Abnormal Female indicating possible myocardial injury.  >=15 - Abnormal Male indicating possible myocardial injury.   Clinicians would  "have to utilize clinical acumen, EKG, Troponin, and serial changes to determine if it is an Acute Myocardial Infarction or myocardial injury due to an underlying chronic condition.         D-dimer, Quantitative [[id number]]  (Normal) Collected: 03/13/23 1839    Specimen: Blood Updated: 03/13/23 2203     D-Dimer, Quantitative <0.27 MCGFEU/mL     Narrative:      According to the assay 's published package insert, a normal (<0.50 MCGFEU/mL) D-dimer result in conjunction with a non-high clinical probability assessment, excludes deep vein thrombosis (DVT) and pulmonary embolism (PE) with high sensitivity.    D-dimer values increase with age and this can make VTE exclusion of an older population difficult. To address this, the [employer], based on best available evidence and recent guidelines, recommends that clinicians use age-adjusted D-dimer thresholds in patients greater than 50 years of age with: a) a low probability of PE who do not meet all Pulmonary Embolism Rule Out Criteria, or b) in those with intermediate probability of PE.   The formula for an age-adjusted D-dimer cut-off is \"age/100\".  For example, a 60 year old patient would have an age-adjusted cut-off of 0.60 MCGFEU/mL and an 80 year old 0.80 MCGFEU/mL.    CBC & Differential [[id number]]  (Abnormal) Collected: 03/13/23 1840    Specimen: Blood Updated: 03/13/23 1848    Narrative:      The following orders were created for panel order CBC & Differential.  Procedure                               Abnormality         Status                     ---------                               -----------         ------                     CBC Auto Differential[[id number]]        Abnormal            Final result                 Please view results for these tests on the individual orders.    CBC Auto Differential [[id number]]  (Abnormal) Collected: 03/13/23 1840    Specimen: Blood Updated: 03/13/23 1848     WBC 9.79 10*3/mm3      RBC 5.33 " 10*6/mm3      Hemoglobin 15.4 g/dL      Hematocrit 47.2 %      MCV 88.6 fL      MCH 28.9 pg      MCHC 32.6 g/dL      RDW 14.5 %      RDW-SD 46.5 fl      MPV 10.4 fL      Platelets 255 10*3/mm3      Neutrophil % 67.8 %      Lymphocyte % 25.9 %      Monocyte % 4.8 %      Eosinophil % 0.6 %      Basophil % 0.7 %      Immature Grans % 0.2 %      Neutrophils, Absolute 6.63 10*3/mm3      Lymphocytes, Absolute 2.54 10*3/mm3      Monocytes, Absolute 0.47 10*3/mm3      Eosinophils, Absolute 0.06 10*3/mm3      Basophils, Absolute 0.07 10*3/mm3      Immature Grans, Absolute 0.02 10*3/mm3      nRBC 0.0 /100 WBC     Urine Drug Screen - Urine, Clean Catch [[id number]]  (Abnormal) Collected: 03/13/23 1844    Specimen: Urine, Clean Catch Updated: 03/13/23 1859     THC, Screen, Urine Positive     Phencyclidine (PCP), Urine Negative     Cocaine Screen, Urine Negative     Methamphetamine, Ur Negative     Opiate Screen Negative     Amphetamine Screen, Urine Negative     Benzodiazepine Screen, Urine Negative     Tricyclic Antidepressants Screen Negative     Methadone Screen, Urine Negative     Barbiturates Screen, Urine Negative     Oxycodone Screen, Urine Negative     Propoxyphene Screen Negative     Buprenorphine, Screen, Urine Negative    Narrative:      Cutoff For Drugs Screened:    Amphetamines               500 ng/ml  Barbiturates               200 ng/ml  Benzodiazepines            150 ng/ml  Cocaine                    150 ng/ml  Methadone                  200 ng/ml  Opiates                    100 ng/ml  Phencyclidine               25 ng/ml  THC                            50 ng/ml  Methamphetamine            500 ng/ml  Tricyclic Antidepressants  300 ng/ml  Oxycodone                  100 ng/ml  Propoxyphene               300 ng/ml  Buprenorphine               10 ng/ml    The normal value for all drugs tested is negative. This report includes unconfirmed screening results, with the cutoff values listed, to be used for medical  treatment purposes only.  Unconfirmed results must not be used for non-medical purposes such as employment or legal testing.  Clinical consideration should be applied to any drug of abuse test, particularly when unconfirmed results are used.      Pregnancy, Urine - Urine, Clean Catch [[id number]]  (Normal) Collected: 03/13/23 1844    Specimen: Urine, Clean Catch Updated: 03/13/23 1853     HCG, Urine QL Negative    Narrative:      Diluted specimens may cause false negative results.    High Sensitivity Troponin T 2Hr [[id number]] Collected: 03/13/23 2122    Specimen: Blood from Arm, Right Updated: 03/13/23 2146     HS Troponin T <6 ng/L      Troponin T Delta --     Comment: Unable to calculate.       Narrative:      High Sensitive Troponin T Reference Range:  <10.0 ng/L- Negative Female for AMI  <15.0 ng/L- Negative Male for AMI  >=10 - Abnormal Female indicating possible myocardial injury.  >=15 - Abnormal Male indicating possible myocardial injury.   Clinicians would have to utilize clinical acumen, EKG, Troponin, and serial changes to determine if it is an Acute Myocardial Infarction or myocardial injury due to an underlying chronic condition.                 XR Chest 1 View   Final Result   No acute cardiopulmonary findings.      Signer Name: Donny Nielson MD    Signed: 3/13/2023 5:52 PM    Workstation Name: [id number]     [facility]            New Medications Ordered This Visit   Medications   • sodium chloride 0.9 % flush 10 mL   • aspirin chewable tablet 324 mg         Procedures            MEDICAL DECISION-MAKING AND ED COURSE:     ED Course as of 03/13/23 2317   Mon Mar 13, 2023   1702 EKG notes sinus rhythm.  91 bpm.  I directly evaluated the EKG of this patient and noted no acute abnormalities.    Electronically signed by Henry Fraire DO, 03/13/23, 5:02 PM EDT.   [SF]   2309 HS Troponin T: <6 [KP]   2309 D-Dimer, Quant: <0.27 [KP]   2316 MDM: 32-year-old female presents with chest  pain, heart score 0, work-up reassuring.  D-dimer WNL, no indication for CT PE.  Recommended nonsteroidal anti-inflammatories, primary care follow-up and return as needed for any new onset or worsening symptoms.  Patient agreeable to plan, all questions answered. [KP]   2316 D-Dimer, Quant: <0.27 [KP]   2316 HS Troponin T: <6 [KP]   2316 HCG, Urine QL: Negative [KP]      ED Course User Index  [KP] Uriah Duran MD  [SF] Henry Fraire, DO       ?      FINAL IMPRESSION:       1. Nonspecific chest pain         The complaints listed here are new problems to this examiner.       Medication List      No changes were made to your prescriptions during this visit.         FOLLOW-UP  PATIENT CONNECTION - [place]  See Provider List  [facility] [address]  [phone]  Schedule an appointment as soon as possible for a visit   If you do not have a primary care provider, call the attached number to schedule an appointment and establish care with a primary care provider.    [facility] Emergency Department  [address] [facility] [id number] [phone]    If symptoms worsen      DISPOSITION  ED Disposition     ED Disposition   Discharge    Condition   Stable    Comment   --                 Please note that portions of this note were completed with a voice recognition program.      Uriah Duran MD  23:16 EDT  3/13/2023             Uriah Duran MD  03/13/23 9543

## 2023-03-17 ENCOUNTER — TRANSCRIBE ORDERS (OUTPATIENT)
Dept: ADMINISTRATIVE | Facility: HOSPITAL | Age: 33
End: 2023-03-17
Payer: COMMERCIAL

## 2023-03-17 DIAGNOSIS — R07.81 PLEURITIC CHEST PAIN: Primary | ICD-10-CM

## 2023-03-24 ENCOUNTER — HOSPITAL ENCOUNTER (OUTPATIENT)
Dept: CARDIOLOGY | Facility: HOSPITAL | Age: 33
Discharge: HOME | End: 2023-03-24
Admitting: PHYSICIAN ASSISTANT
Payer: COMMERCIAL

## 2023-03-24 DIAGNOSIS — R07.81 PLEURITIC CHEST PAIN: ICD-10-CM

## 2023-03-24 LAB
BH CV ECHO MEAS - AO MAX PG: 6 MMHG
BH CV ECHO MEAS - AO MEAN PG: 3 MMHG
BH CV ECHO MEAS - AO ROOT DIAM: 2.1 CM
BH CV ECHO MEAS - AO V2 MAX: 122 CM/SEC
BH CV ECHO MEAS - AO V2 VTI: 27.1 CM
BH CV ECHO MEAS - EDV(CUBED): 59.3 ML
BH CV ECHO MEAS - EDV(MOD-SP4): 61.9 ML
BH CV ECHO MEAS - EF(MOD-SP4): 62.5 %
BH CV ECHO MEAS - ESV(CUBED): 29.8 ML
BH CV ECHO MEAS - ESV(MOD-SP4): 23.2 ML
BH CV ECHO MEAS - FS: 20.5 %
BH CV ECHO MEAS - IVS/LVPW: 0.73 CM
BH CV ECHO MEAS - IVSD: 0.8 CM
BH CV ECHO MEAS - LA DIMENSION: 1.8 CM
BH CV ECHO MEAS - LAT PEAK E' VEL: 12.9 CM/SEC
BH CV ECHO MEAS - LV DIASTOLIC VOL/BSA (35-75): 37.4 CM2
BH CV ECHO MEAS - LV MASS(C)D: 113.6 GRAMS
BH CV ECHO MEAS - LV SYSTOLIC VOL/BSA (12-30): 14 CM2
BH CV ECHO MEAS - LVIDD: 3.9 CM
BH CV ECHO MEAS - LVIDS: 3.1 CM
BH CV ECHO MEAS - LVOT AREA: 2.01 CM2
BH CV ECHO MEAS - LVOT DIAM: 1.6 CM
BH CV ECHO MEAS - LVPWD: 1.1 CM
BH CV ECHO MEAS - MED PEAK E' VEL: 11.1 CM/SEC
BH CV ECHO MEAS - MV A MAX VEL: 44.1 CM/SEC
BH CV ECHO MEAS - MV E MAX VEL: 103 CM/SEC
BH CV ECHO MEAS - MV E/A: 2.34
BH CV ECHO MEAS - PA ACC TIME: 0.13 SEC
BH CV ECHO MEAS - PA PR(ACCEL): 21.9 MMHG
BH CV ECHO MEAS - SI(MOD-SP4): 23.4 ML/M2
BH CV ECHO MEAS - SV(MOD-SP4): 38.7 ML
BH CV ECHO MEAS - TAPSE (>1.6): 2.08 CM
BH CV ECHO MEASUREMENTS AVERAGE E/E' RATIO: 8.58
LEFT ATRIUM VOLUME INDEX: 10.5 ML/M2
MAXIMAL PREDICTED HEART RATE: 188 BPM
STRESS TARGET HR: 160 BPM

## 2023-03-24 PROCEDURE — 93306 TTE W/DOPPLER COMPLETE: CPT | Performed by: INTERNAL MEDICINE

## 2023-03-24 PROCEDURE — 93306 TTE W/DOPPLER COMPLETE: CPT

## 2023-04-05 ENCOUNTER — OFFICE VISIT (OUTPATIENT)
Dept: CARDIOLOGY | Facility: CLINIC | Age: 33
End: 2023-04-05
Payer: COMMERCIAL

## 2023-04-05 VITALS
SYSTOLIC BLOOD PRESSURE: 98 MMHG | HEIGHT: 64 IN | DIASTOLIC BLOOD PRESSURE: 67 MMHG | OXYGEN SATURATION: 97 % | WEIGHT: 131 LBS | BODY MASS INDEX: 22.36 KG/M2 | RESPIRATION RATE: 16 BRPM | HEART RATE: 88 BPM

## 2023-04-05 DIAGNOSIS — R00.2 PALPITATIONS: ICD-10-CM

## 2023-04-05 DIAGNOSIS — R07.2 PRECORDIAL PAIN: Primary | ICD-10-CM

## 2023-04-05 PROCEDURE — 1159F MED LIST DOCD IN RCRD: CPT | Performed by: INTERNAL MEDICINE

## 2023-04-05 PROCEDURE — 99204 OFFICE O/P NEW MOD 45 MIN: CPT | Performed by: INTERNAL MEDICINE

## 2023-04-05 PROCEDURE — 93000 ELECTROCARDIOGRAM COMPLETE: CPT | Performed by: INTERNAL MEDICINE

## 2023-04-05 PROCEDURE — 1160F RVW MEDS BY RX/DR IN RCRD: CPT | Performed by: INTERNAL MEDICINE

## 2023-04-05 RX ORDER — ATOMOXETINE 40 MG/1
40 CAPSULE ORAL EVERY MORNING
COMMUNITY
Start: 2023-03-30

## 2023-04-05 RX ORDER — HYDROXYZINE HYDROCHLORIDE 10 MG/1
10 TABLET, FILM COATED ORAL AS NEEDED
COMMUNITY
Start: 2023-03-29

## 2023-04-05 RX ORDER — ASPIRIN 81 MG/1
81 TABLET ORAL DAILY
COMMUNITY

## 2023-04-05 RX ORDER — FERROUS SULFATE 325(65) MG
325 TABLET ORAL
COMMUNITY

## 2023-04-05 RX ORDER — GUANFACINE 4 MG/1
4 TABLET, EXTENDED RELEASE ORAL DAILY
COMMUNITY
End: 2023-04-05

## 2023-04-05 NOTE — LETTER
April 5, 2023       No Recipients    Patient: Michelle Hernandez   YOB: 1990   Date of Visit: 4/5/2023       Dear Dr. Beltrán Recipients:    Thank you for referring Michelle Hernandez to me for evaluation. Below are the relevant portions of my assessment and plan of care.    If you have questions, please do not hesitate to call me. I look forward to following Michelle along with you.         Sincerely,        Dejon Oseguera MD        CC:   No Recipients    Dejon Oseguera MD  04/05/23 1422  Sign when Signing Visit  Sanket Leiva PA-C  Michelle Hernandez  1990 04/05/2023        Dear Sanket Leiva PA-C:    Subjective      Michelle Hernandez is a 32 y.o. female with the problems as listed above, presents    Chief complaint: Recurrent chest pains    History of Present Illness: Michelle is a pleasant 32-year-old  female with no history of known heart disease or coronary artery disease, nonhypertensive, nondiabetic, history of smoking of about a pack a day since age 13, and a positive family history of premature coronary artery disease, presents with complaints of having recurrent chest pains on and off for the last few weeks.  She described these pains as being felt as tightness across her chest into her left axillary region into the left arm.  The seem to occur at random especially when she gets upset and sometimes with exertion and sometimes at rest.  They are of moderate intensity.  There is some associated shortness of breath.  She has been to the emergency room at [facility] for this and had evaluation which apparently came back unremarkable.      She is now referred to us for further cardiac evaluation and management.  Her last episode was about 3 to 4 days ago when she was just sitting.  She currently denies any chest pain.  She has dyspnea with moderate exertion with no PND, orthopnea pedal edema.  She has a positive family history of premature coronary artery disease with her father and  grand father having had coronary artery disease in their 50s.  She has some intermittent palpitations with severe dizziness but no syncope.  Her recent echo Doppler study on 3/24/2023 apparently revealed normal LV wall motion and systolic function and normal valvular function.    Michelle Hernandez  Complete Transthoracic Echocardiogram with Complete Doppler and Color Flow  Order# [id number]  Reading physician: Bruno Sepulveda MD Ordering physician: Sanket Leiva PA-C Study date: 3/24/23     Patient Information    Patient Name   Michelle Hernandez MRN   [id number] Legal Sex   Female  (Age)   1990 (32 y.o.)       Echocardiogram Findings    Left Ventricle Left ventricular ejection fraction appears to be 56 - 60%.   Septal wall motion is normal. Normal left ventricular cavity size and wall thickness noted. All left ventricular wall segments contract normally. Left ventricular diastolic function was normal.   Right Ventricle Normal right ventricular cavity size, wall thickness, systolic function and septal motion noted.   Left Atrium Normal left atrial size and volume noted.   Right Atrium Normal right atrial cavity size noted.   Aortic Valve No aortic valve regurgitation is present.   Mitral Valve No mitral valve regurgitation is present.   Tricuspid Valve No evidence of significant tricuspid valve regurgitation is present.   Pericardium The pericardium is normal. There is no evidence of pericardial effusion. .       Allergies   Allergen Reactions   • Amoxicillin Rash   • Amoxicillin Rash   • Benadryl [Diphenhydramine] Rash   • Benadryl [Diphenhydramine] Rash   • Penicillins Rash       Current Outpatient Medications:   •  aspirin 81 MG EC tablet, Take 1 tablet by mouth Daily., Disp: , Rfl:   •  atomoxetine (STRATTERA) 40 MG capsule, Take 1 capsule by mouth Every Morning., Disp: , Rfl:   •  cholecalciferol (VITAMIN D3) 1.25 MG (95524 UT) capsule, Take 1 capsule by mouth 1 (One) Time Per Week., Disp: , Rfl:   •  ferrous  "sulfate 325 (65 FE) MG tablet, Take 1 tablet by mouth Daily With Breakfast., Disp: , Rfl:   •  ibuprofen (ADVIL,MOTRIN) 800 MG tablet, Take 1 tablet by mouth Every 6 (Six) Hours As Needed for Moderate Pain ., Disp: 30 tablet, Rfl: 0  •  hydrOXYzine (ATARAX) 10 MG tablet, Take 1 tablet by mouth As Needed., Disp: , Rfl:     No past medical history on file.  Past Surgical History:   Procedure Laterality Date   • DILATATION AND CURETTAGE       Family History   Problem Relation Age of Onset   • Heart disease Father    • Heart disease Paternal Grandfather    • Heart attack Paternal Grandfather      Social History     Tobacco Use   • Smoking status: Every Day     Packs/day: 1.00     Types: Cigarettes   • Smokeless tobacco: Never   Substance Use Topics   • Alcohol use: Never   • Drug use: Yes     Comment: hx THC use     Review of Systems   Constitutional: Positive for decreased appetite and malaise/fatigue.   HENT: Positive for ear discharge, ear pain and hearing loss.    Eyes: Positive for visual disturbance.   Cardiovascular: Positive for chest pain and palpitations.   Respiratory: Positive for cough and shortness of breath.    Musculoskeletal: Positive for back pain, joint pain, muscle weakness and stiffness.   Gastrointestinal: Positive for nausea and vomiting.   Neurological: Positive for dizziness and light-headedness.   Psychiatric/Behavioral: Positive for depression and memory loss. The patient is nervous/anxious.      Objective    Blood pressure 98/67, pulse 88, resp. rate 16, height 162.6 cm (64\"), weight 59.4 kg (131 lb), SpO2 97 %, unknown if currently breastfeeding.  Body mass index is 22.49 kg/m².    Vitals reviewed.   Constitutional:       Appearance: Well-developed.   Eyes:      Conjunctiva/sclera: Conjunctivae normal.   HENT:      Head: Normocephalic.   Neck:      Thyroid: No thyromegaly.      Vascular: No JVD.      Trachea: No tracheal deviation.   Pulmonary:      Effort: No respiratory distress.      " Breath sounds: Normal breath sounds. No wheezing. No rales.   Cardiovascular:      PMI at left midclavicular line. Normal rate. Regular rhythm. Normal S1. Normal S2.      Murmurs: There is no murmur.      No gallop. No click. No rub.   Pulses:     Intact distal pulses.   Edema:     Peripheral edema absent.   Abdominal:      General: Bowel sounds are normal.      Palpations: Abdomen is soft. There is no abdominal mass.      Tenderness: There is no abdominal tenderness.   Musculoskeletal:      Cervical back: Normal range of motion and neck supple. Skin:     General: Skin is warm and dry.   Neurological:      Mental Status: Alert and oriented to person, place, and time.      Cranial Nerves: No cranial nerve deficit.       Lab Results   Component Value Date     03/13/2023    K 3.9 03/13/2023     03/13/2023    CO2 23.4 03/13/2023    BUN 15 03/13/2023    CREATININE 0.70 03/13/2023    GLUCOSE 75 03/13/2023    CALCIUM 9.7 03/13/2023    AST 12 03/13/2023    ALT 10 03/13/2023    ALKPHOS 72 03/13/2023     Lab Results   Component Value Date    CKTOTAL 53 10/03/2020     Lab Results   Component Value Date    WBC 9.79 03/13/2023    HGB 15.4 03/13/2023    HCT 47.2 (H) 03/13/2023     03/13/2023     No results found for: INR  Lab Results   Component Value Date    MG 2.3 10/03/2020     Lab Results   Component Value Date    TSH 1.060 10/03/2020        ECG 12 Lead    Date/Time: 4/5/2023 12:35 PM  Performed by: Dejon Oseguera MD  Authorized by: Dejon Oseguera MD   Comparison: compared with previous ECG from 3/13/2023  Similar to previous ECG  Rhythm: sinus rhythm  Conduction: conduction normal  ST Segments: ST segments normal  T Waves: T waves normal           a      Assessment & Plan :   Diagnosis Plan   1. Precordial pain  Treadmill Stress Test      2. Palpitations  Cardiac Event Monitor        Recommendations:  Orders Placed This Encounter   Procedures   • Treadmill Stress Test   • Cardiac Event Monitor   • ECG  12 Lead      We will evaluate her chest pains with a treadmill stress EKG test.  Evaluate her palpitations with an event monitor  I have also strongly emphasized for her to quit smoking.    Return in about 5 weeks (around 5/10/2023).    As always, Sanket Leiva, PALayneC  I appreciate very much the opportunity to participate in the cardiovascular care of your patients. Please do not hesitate to call me with any questions with regards to Michelle Hernandez's evaluation and management.        With Best Regards,        Dejon Oseguera MD, FACC    Please note that portions of this note were completed with a voice recognition program.

## 2023-04-05 NOTE — PROGRESS NOTES
Sanket Leiva PA-C  Michelle Hernandez  1990 04/05/2023        Dear Sanket Leiva PA-C:    Subjective     Michelle Hernandze is a 32 y.o. female with the problems as listed above, presents    Chief complaint: Recurrent chest pains    History of Present Illness: Michelle is a pleasant 32-year-old  female with no history of known heart disease or coronary artery disease, nonhypertensive, nondiabetic, history of smoking of about a pack a day since age 13, and a positive family history of premature coronary artery disease, presents with complaints of having recurrent chest pains on and off for the last few weeks.  She described these pains as being felt as tightness across her chest into her left axillary region into the left arm.  The seem to occur at random especially when she gets upset and sometimes with exertion and sometimes at rest.  They are of moderate intensity.  There is some associated shortness of breath.  She has been to the emergency room at [facility] for this and had evaluation which apparently came back unremarkable.      She is now referred to us for further cardiac evaluation and management.  Her last episode was about 3 to 4 days ago when she was just sitting.  She currently denies any chest pain.  She has dyspnea with moderate exertion with no PND, orthopnea pedal edema.  She has a positive family history of premature coronary artery disease with her father and grand father having had coronary artery disease in their 50s.  She has some intermittent palpitations with severe dizziness but no syncope.  Her recent echo Doppler study on 3/24/2023 apparently revealed normal LV wall motion and systolic function and normal valvular function.    Michelle Hernandez  Complete Transthoracic Echocardiogram with Complete Doppler and Color Flow  Order# [id number]  Reading physician: Bruno Sepulveda MD Ordering physician: Sanket Leiva PA-C Study date: 3/24/23     Patient  Information    Patient Name   Michelle eHrnandez MRN   [id number] Legal Sex   Female  (Age)   1990 (32 y.o.)       Echocardiogram Findings    Left Ventricle Left ventricular ejection fraction appears to be 56 - 60%.   Septal wall motion is normal. Normal left ventricular cavity size and wall thickness noted. All left ventricular wall segments contract normally. Left ventricular diastolic function was normal.   Right Ventricle Normal right ventricular cavity size, wall thickness, systolic function and septal motion noted.   Left Atrium Normal left atrial size and volume noted.   Right Atrium Normal right atrial cavity size noted.   Aortic Valve No aortic valve regurgitation is present.   Mitral Valve No mitral valve regurgitation is present.   Tricuspid Valve No evidence of significant tricuspid valve regurgitation is present.   Pericardium The pericardium is normal. There is no evidence of pericardial effusion. .       Allergies   Allergen Reactions   • Amoxicillin Rash   • Amoxicillin Rash   • Benadryl [Diphenhydramine] Rash   • Benadryl [Diphenhydramine] Rash   • Penicillins Rash       Current Outpatient Medications:   •  aspirin 81 MG EC tablet, Take 1 tablet by mouth Daily., Disp: , Rfl:   •  atomoxetine (STRATTERA) 40 MG capsule, Take 1 capsule by mouth Every Morning., Disp: , Rfl:   •  cholecalciferol (VITAMIN D3) 1.25 MG (78939 UT) capsule, Take 1 capsule by mouth 1 (One) Time Per Week., Disp: , Rfl:   •  ferrous sulfate 325 (65 FE) MG tablet, Take 1 tablet by mouth Daily With Breakfast., Disp: , Rfl:   •  ibuprofen (ADVIL,MOTRIN) 800 MG tablet, Take 1 tablet by mouth Every 6 (Six) Hours As Needed for Moderate Pain ., Disp: 30 tablet, Rfl: 0  •  hydrOXYzine (ATARAX) 10 MG tablet, Take 1 tablet by mouth As Needed., Disp: , Rfl:     No past medical history on file.  Past Surgical History:   Procedure Laterality Date   • DILATATION AND CURETTAGE       Family History   Problem Relation Age of Onset   • Heart  "disease Father    • Heart disease Paternal Grandfather    • Heart attack Paternal Grandfather      Social History     Tobacco Use   • Smoking status: Every Day     Packs/day: 1.00     Types: Cigarettes   • Smokeless tobacco: Never   Substance Use Topics   • Alcohol use: Never   • Drug use: Yes     Comment: hx THC use     Review of Systems   Constitutional: Positive for decreased appetite and malaise/fatigue.   HENT: Positive for ear discharge, ear pain and hearing loss.    Eyes: Positive for visual disturbance.   Cardiovascular: Positive for chest pain and palpitations.   Respiratory: Positive for cough and shortness of breath.    Musculoskeletal: Positive for back pain, joint pain, muscle weakness and stiffness.   Gastrointestinal: Positive for nausea and vomiting.   Neurological: Positive for dizziness and light-headedness.   Psychiatric/Behavioral: Positive for depression and memory loss. The patient is nervous/anxious.      Objective   Blood pressure 98/67, pulse 88, resp. rate 16, height 162.6 cm (64\"), weight 59.4 kg (131 lb), SpO2 97 %, unknown if currently breastfeeding.  Body mass index is 22.49 kg/m².    Vitals reviewed.   Constitutional:       Appearance: Well-developed.   Eyes:      Conjunctiva/sclera: Conjunctivae normal.   HENT:      Head: Normocephalic.   Neck:      Thyroid: No thyromegaly.      Vascular: No JVD.      Trachea: No tracheal deviation.   Pulmonary:      Effort: No respiratory distress.      Breath sounds: Normal breath sounds. No wheezing. No rales.   Cardiovascular:      PMI at left midclavicular line. Normal rate. Regular rhythm. Normal S1. Normal S2.      Murmurs: There is no murmur.      No gallop. No click. No rub.   Pulses:     Intact distal pulses.   Edema:     Peripheral edema absent.   Abdominal:      General: Bowel sounds are normal.      Palpations: Abdomen is soft. There is no abdominal mass.      Tenderness: There is no abdominal tenderness.   Musculoskeletal:      Cervical " back: Normal range of motion and neck supple. Skin:     General: Skin is warm and dry.   Neurological:      Mental Status: Alert and oriented to person, place, and time.      Cranial Nerves: No cranial nerve deficit.       Lab Results   Component Value Date     03/13/2023    K 3.9 03/13/2023     03/13/2023    CO2 23.4 03/13/2023    BUN 15 03/13/2023    CREATININE 0.70 03/13/2023    GLUCOSE 75 03/13/2023    CALCIUM 9.7 03/13/2023    AST 12 03/13/2023    ALT 10 03/13/2023    ALKPHOS 72 03/13/2023     Lab Results   Component Value Date    CKTOTAL 53 10/03/2020     Lab Results   Component Value Date    WBC 9.79 03/13/2023    HGB 15.4 03/13/2023    HCT 47.2 (H) 03/13/2023     03/13/2023     No results found for: INR  Lab Results   Component Value Date    MG 2.3 10/03/2020     Lab Results   Component Value Date    TSH 1.060 10/03/2020        ECG 12 Lead    Date/Time: 4/5/2023 12:35 PM  Performed by: Dejon Oseguera MD  Authorized by: Dejon Oseguera MD   Comparison: compared with previous ECG from 3/13/2023  Similar to previous ECG  Rhythm: sinus rhythm  Conduction: conduction normal  ST Segments: ST segments normal  T Waves: T waves normal           a      Assessment & Plan :   Diagnosis Plan   1. Precordial pain  Treadmill Stress Test      2. Palpitations  Cardiac Event Monitor        Recommendations:  Orders Placed This Encounter   Procedures   • Treadmill Stress Test   • Cardiac Event Monitor   • ECG 12 Lead      1. We will evaluate her chest pains with a treadmill stress EKG test.  2. Evaluate her palpitations with an event monitor  3. I have also strongly emphasized for her to quit smoking.    Return in about 5 weeks (around 5/10/2023).    As always, Sanket Leiva, PA-C  I appreciate very much the opportunity to participate in the cardiovascular care of your patients. Please do not hesitate to call me with any questions with regards to Michelle Hernandez's evaluation and management.        With  Best Regards,        Dejon Oseguera MD, [facility]    Please note that portions of this note were completed with a voice recognition program.

## 2023-04-05 NOTE — LETTER
April 5, 2023     Sanket Leiva PA-C  [address] [facility] [place] [address]    Patient: Michelle Hernandez   YOB: 1990   Date of Visit: 4/5/2023       Dear Sanket:    Thank you for referring Michelle Hernandez to me for evaluation. Below are the relevant portions of my assessment and plan of care.    If you have questions, please do not hesitate to call me. I look forward to following Michelle along with you.         Sincerely,        Dejon Oseguera MD        CC: No Recipients    Dejon Oseguera MD  04/05/23 1422  Sign when Signing Visit  Sanket Leiva PA-C  Michelle Hernandez  1990 04/05/2023        Dear Sanket Leiva PA-C:    Subjective      Michelle Hernandez is a 32 y.o. female with the problems as listed above, presents    Chief complaint: Recurrent chest pains    History of Present Illness: Michelle is a pleasant 32-year-old  female with no history of known heart disease or coronary artery disease, nonhypertensive, nondiabetic, history of smoking of about a pack a day since age 13, and a positive family history of premature coronary artery disease, presents with complaints of having recurrent chest pains on and off for the last few weeks.  She described these pains as being felt as tightness across her chest into her left axillary region into the left arm.  The seem to occur at random especially when she gets upset and sometimes with exertion and sometimes at rest.  They are of moderate intensity.  There is some associated shortness of breath.  She has been to the emergency room at [facility] for this and had evaluation which apparently came back unremarkable.      She is now referred to us for further cardiac evaluation and management.  Her last episode was about 3 to 4 days ago when she was just sitting.  She currently denies any chest pain.  She has dyspnea with moderate exertion with no PND, orthopnea pedal edema.  She has a positive family history of premature  coronary artery disease with her father and grand father having had coronary artery disease in their 50s.  She has some intermittent palpitations with severe dizziness but no syncope.  Her recent echo Doppler study on 3/24/2023 apparently revealed normal LV wall motion and systolic function and normal valvular function.    Michelle Hernandez  Complete Transthoracic Echocardiogram with Complete Doppler and Color Flow  Order# [id number]  Reading physician: Bruno Sepulveda MD Ordering physician: Sanket Leiva PA-C Study date: 3/24/23     Patient Information    Patient Name   Michelle Hernandez MRN   [id number] Legal Sex   Female  (Age)   1990 (32 y.o.)       Echocardiogram Findings    Left Ventricle Left ventricular ejection fraction appears to be 56 - 60%.   Septal wall motion is normal. Normal left ventricular cavity size and wall thickness noted. All left ventricular wall segments contract normally. Left ventricular diastolic function was normal.   Right Ventricle Normal right ventricular cavity size, wall thickness, systolic function and septal motion noted.   Left Atrium Normal left atrial size and volume noted.   Right Atrium Normal right atrial cavity size noted.   Aortic Valve No aortic valve regurgitation is present.   Mitral Valve No mitral valve regurgitation is present.   Tricuspid Valve No evidence of significant tricuspid valve regurgitation is present.   Pericardium The pericardium is normal. There is no evidence of pericardial effusion. .       Allergies   Allergen Reactions   • Amoxicillin Rash   • Amoxicillin Rash   • Benadryl [Diphenhydramine] Rash   • Benadryl [Diphenhydramine] Rash   • Penicillins Rash       Current Outpatient Medications:   •  aspirin 81 MG EC tablet, Take 1 tablet by mouth Daily., Disp: , Rfl:   •  atomoxetine (STRATTERA) 40 MG capsule, Take 1 capsule by mouth Every Morning., Disp: , Rfl:   •  cholecalciferol (VITAMIN D3) 1.25 MG (41378 UT) capsule, Take 1 capsule by mouth 1  "(One) Time Per Week., Disp: , Rfl:   •  ferrous sulfate 325 (65 FE) MG tablet, Take 1 tablet by mouth Daily With Breakfast., Disp: , Rfl:   •  ibuprofen (ADVIL,MOTRIN) 800 MG tablet, Take 1 tablet by mouth Every 6 (Six) Hours As Needed for Moderate Pain ., Disp: 30 tablet, Rfl: 0  •  hydrOXYzine (ATARAX) 10 MG tablet, Take 1 tablet by mouth As Needed., Disp: , Rfl:     No past medical history on file.  Past Surgical History:   Procedure Laterality Date   • DILATATION AND CURETTAGE       Family History   Problem Relation Age of Onset   • Heart disease Father    • Heart disease Paternal Grandfather    • Heart attack Paternal Grandfather      Social History     Tobacco Use   • Smoking status: Every Day     Packs/day: 1.00     Types: Cigarettes   • Smokeless tobacco: Never   Substance Use Topics   • Alcohol use: Never   • Drug use: Yes     Comment: hx THC use     Review of Systems   Constitutional: Positive for decreased appetite and malaise/fatigue.   HENT: Positive for ear discharge, ear pain and hearing loss.    Eyes: Positive for visual disturbance.   Cardiovascular: Positive for chest pain and palpitations.   Respiratory: Positive for cough and shortness of breath.    Musculoskeletal: Positive for back pain, joint pain, muscle weakness and stiffness.   Gastrointestinal: Positive for nausea and vomiting.   Neurological: Positive for dizziness and light-headedness.   Psychiatric/Behavioral: Positive for depression and memory loss. The patient is nervous/anxious.      Objective    Blood pressure 98/67, pulse 88, resp. rate 16, height 162.6 cm (64\"), weight 59.4 kg (131 lb), SpO2 97 %, unknown if currently breastfeeding.  Body mass index is 22.49 kg/m².    Vitals reviewed.   Constitutional:       Appearance: Well-developed.   Eyes:      Conjunctiva/sclera: Conjunctivae normal.   HENT:      Head: Normocephalic.   Neck:      Thyroid: No thyromegaly.      Vascular: No JVD.      Trachea: No tracheal deviation.   Pulmonary: "      Effort: No respiratory distress.      Breath sounds: Normal breath sounds. No wheezing. No rales.   Cardiovascular:      PMI at left midclavicular line. Normal rate. Regular rhythm. Normal S1. Normal S2.      Murmurs: There is no murmur.      No gallop. No click. No rub.   Pulses:     Intact distal pulses.   Edema:     Peripheral edema absent.   Abdominal:      General: Bowel sounds are normal.      Palpations: Abdomen is soft. There is no abdominal mass.      Tenderness: There is no abdominal tenderness.   Musculoskeletal:      Cervical back: Normal range of motion and neck supple. Skin:     General: Skin is warm and dry.   Neurological:      Mental Status: Alert and oriented to person, place, and time.      Cranial Nerves: No cranial nerve deficit.       Lab Results   Component Value Date     03/13/2023    K 3.9 03/13/2023     03/13/2023    CO2 23.4 03/13/2023    BUN 15 03/13/2023    CREATININE 0.70 03/13/2023    GLUCOSE 75 03/13/2023    CALCIUM 9.7 03/13/2023    AST 12 03/13/2023    ALT 10 03/13/2023    ALKPHOS 72 03/13/2023     Lab Results   Component Value Date    CKTOTAL 53 10/03/2020     Lab Results   Component Value Date    WBC 9.79 03/13/2023    HGB 15.4 03/13/2023    HCT 47.2 (H) 03/13/2023     03/13/2023     No results found for: INR  Lab Results   Component Value Date    MG 2.3 10/03/2020     Lab Results   Component Value Date    TSH 1.060 10/03/2020        ECG 12 Lead    Date/Time: 4/5/2023 12:35 PM  Performed by: Dejon Oseguera MD  Authorized by: Dejon Oseguera MD   Comparison: compared with previous ECG from 3/13/2023  Similar to previous ECG  Rhythm: sinus rhythm  Conduction: conduction normal  ST Segments: ST segments normal  T Waves: T waves normal           a      Assessment & Plan :   Diagnosis Plan   1. Precordial pain  Treadmill Stress Test      2. Palpitations  Cardiac Event Monitor        Recommendations:  Orders Placed This Encounter   Procedures   • Treadmill  Stress Test   • Cardiac Event Monitor   • ECG 12 Lead      We will evaluate her chest pains with a treadmill stress EKG test.  Evaluate her palpitations with an event monitor  I have also strongly emphasized for her to quit smoking.    Return in about 5 weeks (around 5/10/2023).    As always, Sanket Leiva, PALayneC  I appreciate very much the opportunity to participate in the cardiovascular care of your patients. Please do not hesitate to call me with any questions with regards to Michelle Hernandez's evaluation and management.        With Best Regards,        Dejon Oseguera MD, [facility]    Please note that portions of this note were completed with a voice recognition program.

## 2023-04-07 ENCOUNTER — PATIENT MESSAGE (OUTPATIENT)
Dept: CARDIOLOGY | Facility: CLINIC | Age: 33
End: 2023-04-07
Payer: COMMERCIAL

## 2023-04-07 ENCOUNTER — PATIENT ROUNDING (BHMG ONLY) (OUTPATIENT)
Dept: CARDIOLOGY | Facility: CLINIC | Age: 33
End: 2023-04-07
Payer: COMMERCIAL

## 2023-05-08 ENCOUNTER — HOSPITAL ENCOUNTER (OUTPATIENT)
Dept: CARDIOLOGY | Facility: HOSPITAL | Age: 33
Discharge: HOME | End: 2023-05-08
Admitting: INTERNAL MEDICINE
Payer: COMMERCIAL

## 2023-05-08 ENCOUNTER — TREATMENT (OUTPATIENT)
Dept: CARDIOLOGY | Facility: CLINIC | Age: 33
End: 2023-05-08
Payer: COMMERCIAL

## 2023-05-08 DIAGNOSIS — R07.2 PRECORDIAL PAIN: ICD-10-CM

## 2023-05-08 DIAGNOSIS — R00.2 PALPITATIONS: ICD-10-CM

## 2023-05-08 LAB
BH CV STRESS BP STAGE 1: NORMAL
BH CV STRESS BP STAGE 2: NORMAL
BH CV STRESS DURATION MIN STAGE 1: 3
BH CV STRESS DURATION MIN STAGE 2: 3
BH CV STRESS DURATION MIN STAGE 3: 1
BH CV STRESS DURATION SEC STAGE 1: 0
BH CV STRESS DURATION SEC STAGE 2: 0
BH CV STRESS DURATION SEC STAGE 3: 55
BH CV STRESS GRADE STAGE 1: 10
BH CV STRESS GRADE STAGE 2: 12
BH CV STRESS GRADE STAGE 3: 14
BH CV STRESS HR STAGE 1: 121
BH CV STRESS HR STAGE 2: 155
BH CV STRESS HR STAGE 3: 187
BH CV STRESS METS STAGE 1: 5
BH CV STRESS METS STAGE 2: 7.5
BH CV STRESS METS STAGE 3: 10
BH CV STRESS PROTOCOL 1: NORMAL
BH CV STRESS RECOVERY BP: NORMAL MMHG
BH CV STRESS RECOVERY HR: 108 BPM
BH CV STRESS SPEED STAGE 1: 1.7
BH CV STRESS SPEED STAGE 2: 2.5
BH CV STRESS SPEED STAGE 3: 3.4
BH CV STRESS STAGE 1: 1
BH CV STRESS STAGE 2: 2
BH CV STRESS STAGE 3: 3
MAXIMAL PREDICTED HEART RATE: 188 BPM
PERCENT MAX PREDICTED HR: 99.47 %
STRESS BASELINE BP: NORMAL MMHG
STRESS BASELINE HR: 78 BPM
STRESS PERCENT HR: 117 %
STRESS POST ESTIMATED WORKLOAD: 10.1 METS
STRESS POST EXERCISE DUR MIN: 7 MIN
STRESS POST EXERCISE DUR SEC: 55 SEC
STRESS POST PEAK BP: NORMAL MMHG
STRESS POST PEAK HR: 187 BPM
STRESS TARGET HR: 160 BPM

## 2023-05-08 PROCEDURE — 93017 CV STRESS TEST TRACING ONLY: CPT

## 2023-05-08 PROCEDURE — 93018 CV STRESS TEST I&R ONLY: CPT | Performed by: INTERNAL MEDICINE

## 2023-05-17 ENCOUNTER — LAB (OUTPATIENT)
Dept: LAB | Facility: HOSPITAL | Age: 33
End: 2023-05-17
Payer: COMMERCIAL

## 2023-05-17 ENCOUNTER — OFFICE VISIT (OUTPATIENT)
Dept: CARDIOLOGY | Facility: CLINIC | Age: 33
End: 2023-05-17
Payer: COMMERCIAL

## 2023-05-17 VITALS
WEIGHT: 134.8 LBS | BODY MASS INDEX: 23.01 KG/M2 | HEART RATE: 74 BPM | HEIGHT: 64 IN | RESPIRATION RATE: 16 BRPM | SYSTOLIC BLOOD PRESSURE: 106 MMHG | DIASTOLIC BLOOD PRESSURE: 67 MMHG | OXYGEN SATURATION: 99 %

## 2023-05-17 DIAGNOSIS — R00.0 SINUS TACHYCARDIA: ICD-10-CM

## 2023-05-17 DIAGNOSIS — R00.0 SINUS TACHYCARDIA: Primary | ICD-10-CM

## 2023-05-17 DIAGNOSIS — R06.09 DYSPNEA ON EXERTION: ICD-10-CM

## 2023-05-17 LAB
ALBUMIN SERPL-MCNC: 4.2 G/DL (ref 3.5–5.2)
ALBUMIN/GLOB SERPL: 1.6 G/DL
ALP SERPL-CCNC: 114 U/L (ref 39–117)
ALT SERPL W P-5'-P-CCNC: 27 U/L (ref 1–33)
ANION GAP SERPL CALCULATED.3IONS-SCNC: 10.7 MMOL/L (ref 5–15)
AST SERPL-CCNC: 26 U/L (ref 1–32)
BACTERIA UR QL AUTO: NORMAL /HPF
BASOPHILS # BLD AUTO: 0.07 10*3/MM3 (ref 0–0.2)
BASOPHILS NFR BLD AUTO: 0.7 % (ref 0–1.5)
BILIRUB SERPL-MCNC: <0.2 MG/DL (ref 0–1.2)
BILIRUB UR QL STRIP: NEGATIVE
BUN SERPL-MCNC: 19 MG/DL (ref 6–20)
BUN/CREAT SERPL: 28.8 (ref 7–25)
CALCIUM SPEC-SCNC: 9.5 MG/DL (ref 8.6–10.5)
CHLORIDE SERPL-SCNC: 103 MMOL/L (ref 98–107)
CLARITY UR: CLEAR
CO2 SERPL-SCNC: 25.3 MMOL/L (ref 22–29)
COLOR UR: YELLOW
CREAT SERPL-MCNC: 0.66 MG/DL (ref 0.57–1)
CRP SERPL-MCNC: 0.35 MG/DL (ref 0–0.5)
DEPRECATED RDW RBC AUTO: 41.5 FL (ref 37–54)
EGFRCR SERPLBLD CKD-EPI 2021: 119 ML/MIN/1.73
EOSINOPHIL # BLD AUTO: 0.41 10*3/MM3 (ref 0–0.4)
EOSINOPHIL NFR BLD AUTO: 4.1 % (ref 0.3–6.2)
ERYTHROCYTE [DISTWIDTH] IN BLOOD BY AUTOMATED COUNT: 13.6 % (ref 12.3–15.4)
GLOBULIN UR ELPH-MCNC: 2.7 GM/DL
GLUCOSE SERPL-MCNC: 91 MG/DL (ref 65–99)
GLUCOSE UR STRIP-MCNC: NEGATIVE MG/DL
HCT VFR BLD AUTO: 44.8 % (ref 34–46.6)
HGB BLD-MCNC: 14.7 G/DL (ref 12–15.9)
HGB UR QL STRIP.AUTO: NEGATIVE
HYALINE CASTS UR QL AUTO: NORMAL /LPF
IMM GRANULOCYTES # BLD AUTO: 0.03 10*3/MM3 (ref 0–0.05)
IMM GRANULOCYTES NFR BLD AUTO: 0.3 % (ref 0–0.5)
KETONES UR QL STRIP: NEGATIVE
LEUKOCYTE ESTERASE UR QL STRIP.AUTO: ABNORMAL
LYMPHOCYTES # BLD AUTO: 2.59 10*3/MM3 (ref 0.7–3.1)
LYMPHOCYTES NFR BLD AUTO: 25.6 % (ref 19.6–45.3)
MAGNESIUM SERPL-MCNC: 2.3 MG/DL (ref 1.6–2.6)
MCH RBC QN AUTO: 27.9 PG (ref 26.6–33)
MCHC RBC AUTO-ENTMCNC: 32.8 G/DL (ref 31.5–35.7)
MCV RBC AUTO: 85 FL (ref 79–97)
MONOCYTES # BLD AUTO: 0.58 10*3/MM3 (ref 0.1–0.9)
MONOCYTES NFR BLD AUTO: 5.7 % (ref 5–12)
NEUTROPHILS NFR BLD AUTO: 6.43 10*3/MM3 (ref 1.7–7)
NEUTROPHILS NFR BLD AUTO: 63.6 % (ref 42.7–76)
NITRITE UR QL STRIP: NEGATIVE
NRBC BLD AUTO-RTO: 0 /100 WBC (ref 0–0.2)
PH UR STRIP.AUTO: 6.5 [PH] (ref 5–8)
PLATELET # BLD AUTO: 278 10*3/MM3 (ref 140–450)
PMV BLD AUTO: 10.9 FL (ref 6–12)
POTASSIUM SERPL-SCNC: 4.9 MMOL/L (ref 3.5–5.2)
PROT SERPL-MCNC: 6.9 G/DL (ref 6–8.5)
PROT UR QL STRIP: NEGATIVE
RBC # BLD AUTO: 5.27 10*6/MM3 (ref 3.77–5.28)
RBC # UR STRIP: NORMAL /HPF
REF LAB TEST METHOD: NORMAL
SODIUM SERPL-SCNC: 139 MMOL/L (ref 136–145)
SP GR UR STRIP: 1.02 (ref 1–1.03)
SQUAMOUS #/AREA URNS HPF: NORMAL /HPF
TSH SERPL DL<=0.05 MIU/L-ACNC: 2.22 UIU/ML (ref 0.27–4.2)
UROBILINOGEN UR QL STRIP: ABNORMAL
WBC # UR STRIP: NORMAL /HPF
WBC NRBC COR # BLD: 10.11 10*3/MM3 (ref 3.4–10.8)

## 2023-05-17 PROCEDURE — 81001 URINALYSIS AUTO W/SCOPE: CPT

## 2023-05-17 PROCEDURE — 84443 ASSAY THYROID STIM HORMONE: CPT

## 2023-05-17 PROCEDURE — 83735 ASSAY OF MAGNESIUM: CPT

## 2023-05-17 PROCEDURE — 36415 COLL VENOUS BLD VENIPUNCTURE: CPT

## 2023-05-17 PROCEDURE — 86038 ANTINUCLEAR ANTIBODIES: CPT

## 2023-05-17 PROCEDURE — 1160F RVW MEDS BY RX/DR IN RCRD: CPT | Performed by: NURSE PRACTITIONER

## 2023-05-17 PROCEDURE — 86140 C-REACTIVE PROTEIN: CPT

## 2023-05-17 PROCEDURE — 85025 COMPLETE CBC W/AUTO DIFF WBC: CPT

## 2023-05-17 PROCEDURE — 80053 COMPREHEN METABOLIC PANEL: CPT

## 2023-05-17 PROCEDURE — 99214 OFFICE O/P EST MOD 30 MIN: CPT | Performed by: NURSE PRACTITIONER

## 2023-05-17 PROCEDURE — 1159F MED LIST DOCD IN RCRD: CPT | Performed by: NURSE PRACTITIONER

## 2023-05-17 RX ORDER — PRAZOSIN HYDROCHLORIDE 1 MG/1
1 CAPSULE ORAL NIGHTLY
COMMUNITY

## 2023-05-17 RX ORDER — ONDANSETRON 4 MG/1
4 TABLET, FILM COATED ORAL EVERY 8 HOURS PRN
COMMUNITY

## 2023-05-17 RX ORDER — SERTRALINE HYDROCHLORIDE 25 MG/1
25 TABLET, FILM COATED ORAL DAILY
COMMUNITY

## 2023-05-17 RX ORDER — AZITHROMYCIN 250 MG/1
500 TABLET, FILM COATED ORAL DAILY
COMMUNITY

## 2023-05-17 NOTE — PROGRESS NOTES
Sanket Leiva PA-C  Michelle Hernandez  1990 05/17/2023    There is no problem list on file for this patient.      Dear Sanket Leiva PA-C:    Subjective     Chief Complaint   Patient presents with   • Follow-up     Stress test and event monitor   • Palpitations     unchanged   • Shortness of Breath     Mild exertion   • Med Management     presented   • Fatigue     No stamina           History of Present Illness:    Michelle Hernandez is a 33 y.o. female with a past medical history of palpitations and chest discomfort with severe fatigue.  She was evaluated further with a treadmill stress test.  She did achieve target heart rate and 10.1 METS workload with no ECG evidence of myocardial ischemia.  She did wear a 30-day event monitor which revealed predominantly sinus rhythm with a heart rate ranging from 45 to 179 bpm.  There were occasional PACs and PVCs.  There was no significant bradycardia, AV block, or long pauses.  Symptoms of tired, fatigue correlated with sinus rhythm in the 70s to 120s.  Chest pain correlated with nondiagnostic ST/T changes.  Patient reports her main issue seems to be the palpitations.  When she is at work her heart rate becomes very elevated and she feels like she is going to pass out.  She denies any syncopal episodes.  She did have an echocardiogram in March 2023 which revealed normal LV function.          Allergies   Allergen Reactions   • Amoxicillin Rash   • Amoxicillin Rash   • Benadryl [Diphenhydramine] Rash   • Benadryl [Diphenhydramine] Rash   • Penicillins Rash   :      Current Outpatient Medications:   •  atomoxetine (STRATTERA) 40 MG capsule, Take 1 capsule by mouth Every Morning., Disp: , Rfl:   •  azithromycin (ZITHROMAX) 250 MG tablet, Take 2 tablets by mouth Daily., Disp: , Rfl:   •  cholecalciferol (VITAMIN D3) 1.25 MG (24007 UT) capsule, Take 1 capsule by mouth 1 (One) Time Per Week., Disp: , Rfl:   •  ferrous sulfate 325 (65 FE) MG tablet, Take 1 tablet by mouth  "Daily With Breakfast., Disp: , Rfl:   •  hydrOXYzine (ATARAX) 10 MG tablet, Take 1 tablet by mouth As Needed., Disp: , Rfl:   •  ibuprofen (ADVIL,MOTRIN) 800 MG tablet, Take 1 tablet by mouth Every 6 (Six) Hours As Needed for Moderate Pain ., Disp: 30 tablet, Rfl: 0  •  ondansetron (ZOFRAN) 4 MG tablet, Take 1 tablet by mouth Every 8 (Eight) Hours As Needed for Nausea or Vomiting., Disp: , Rfl:   •  prazosin (MINIPRESS) 1 MG capsule, Take 1 capsule by mouth Every Night., Disp: , Rfl:   •  sertraline (ZOLOFT) 25 MG tablet, Take 1 tablet by mouth Daily., Disp: , Rfl:   •  aspirin 81 MG EC tablet, Take 1 tablet by mouth Daily. (Patient not taking: Reported on 5/17/2023), Disp: , Rfl:       The following portions of the patient's history were reviewed and updated as appropriate: allergies, current medications, past family history, past medical history, past social history, past surgical history and problem list.    Social History     Tobacco Use   • Smoking status: Every Day     Packs/day: 1.00     Types: Cigarettes   • Smokeless tobacco: Never   Substance Use Topics   • Alcohol use: Never   • Drug use: Yes     Comment: hx THC use       Review of Systems   Constitutional: Positive for malaise/fatigue. Negative for decreased appetite.   Cardiovascular: Positive for dyspnea on exertion and palpitations. Negative for chest pain.   Respiratory: Positive for shortness of breath. Negative for cough.        Objective   Vitals:    05/17/23 0900   BP: 106/67   Pulse: 74   Resp: 16   SpO2: 99%   Weight: 61.1 kg (134 lb 12.8 oz)   Height: 162.6 cm (64\")     Body mass index is 23.14 kg/m².        Vitals reviewed.   Constitutional:       Appearance: Healthy appearance. Well-developed and not in distress.   HENT:      Head: Normocephalic and atraumatic.   Neck:      Vascular: No carotid bruit or JVD.   Pulmonary:      Effort: Pulmonary effort is normal.      Breath sounds: Normal breath sounds. No wheezing. No rales. "   Cardiovascular:      Normal rate. Regular rhythm.      Murmurs: There is no murmur.      . No S3 and S4 gallop.   Abdominal:      General: Bowel sounds are normal.      Palpations: Abdomen is soft.   Skin:     General: Skin is warm and dry.   Neurological:      Mental Status: Alert, oriented to person, place, and time and oriented to person, place and time.   Psychiatric:         Mood and Affect: Mood normal.         Behavior: Behavior normal.         Lab Results   Component Value Date     03/13/2023    K 3.9 03/13/2023     03/13/2023    CO2 23.4 03/13/2023    BUN 15 03/13/2023    CREATININE 0.70 03/13/2023    GLUCOSE 75 03/13/2023    CALCIUM 9.7 03/13/2023    AST 12 03/13/2023    ALT 10 03/13/2023    ALKPHOS 72 03/13/2023     Lab Results   Component Value Date    CKTOTAL 53 10/03/2020     Lab Results   Component Value Date    WBC 9.79 03/13/2023    HGB 15.4 03/13/2023    HCT 47.2 (H) 03/13/2023     03/13/2023     No results found for: INR  Lab Results   Component Value Date    MG 2.3 10/03/2020     Lab Results   Component Value Date    TSH 1.060 10/03/2020      No results found for: BNP        Procedures      Assessment & Plan    Diagnosis Plan   1. Sinus tachycardia  CBC & Differential    Comprehensive Metabolic Panel    KERA    C-reactive Protein    TSH    Urinalysis With Microscopic - Urine, Clean Catch    Magnesium      2. Dyspnea on exertion  CBC & Differential    Comprehensive Metabolic Panel    KERA    C-reactive Protein    TSH    Urinalysis With Microscopic - Urine, Clean Catch    Magnesium                   Recommendations:    1. We discussed the results of the event monitor today.  2. We will evaluate the sinus tachycardia further with a CBC, CMP, KERA, CRP, TSH, urinalysis, and magnesium level.  3. I did advise her to stay very well-hydrated.  4. We will hold off on beta-blocker at this time since she does have borderline low blood pressure at baseline.  5. Follow-up in 6 weeks or  sooner if needed.        Return in about 6 weeks (around 6/28/2023) for Recheck.    As always, I appreciate very much the opportunity to participate in the cardiovascular care of your patients.      With Best Regards,    MITZY Melton

## 2023-05-18 LAB — ANA SER QL: NEGATIVE

## 2024-08-16 ENCOUNTER — HOSPITAL ENCOUNTER (OUTPATIENT)
Facility: HOSPITAL | Age: 34
Discharge: HOME | End: 2024-08-16
Attending: OBSTETRICS & GYNECOLOGY | Admitting: OBSTETRICS & GYNECOLOGY
Payer: MEDICAID

## 2024-08-16 ENCOUNTER — HOSPITAL ENCOUNTER (EMERGENCY)
Facility: HOSPITAL | Age: 34
Discharge: HOME | End: 2024-08-16
Attending: STUDENT IN AN ORGANIZED HEALTH CARE EDUCATION/TRAINING PROGRAM
Payer: MEDICAID

## 2024-08-16 VITALS
HEART RATE: 70 BPM | DIASTOLIC BLOOD PRESSURE: 64 MMHG | RESPIRATION RATE: 20 BRPM | SYSTOLIC BLOOD PRESSURE: 115 MMHG | TEMPERATURE: 98.5 F | HEIGHT: 64 IN | BODY MASS INDEX: 27.35 KG/M2 | WEIGHT: 160.2 LBS

## 2024-08-16 VITALS
WEIGHT: 165 LBS | SYSTOLIC BLOOD PRESSURE: 116 MMHG | BODY MASS INDEX: 28.17 KG/M2 | OXYGEN SATURATION: 98 % | TEMPERATURE: 97 F | RESPIRATION RATE: 14 BRPM | HEIGHT: 64 IN | HEART RATE: 79 BPM | DIASTOLIC BLOOD PRESSURE: 67 MMHG

## 2024-08-16 DIAGNOSIS — L03.114 CELLULITIS OF LEFT UPPER EXTREMITY: Primary | ICD-10-CM

## 2024-08-16 LAB
DEPRECATED RDW RBC AUTO: 43.2 FL (ref 37–54)
ERYTHROCYTE [DISTWIDTH] IN BLOOD BY AUTOMATED COUNT: 13.3 % (ref 12.3–15.4)
HCT VFR BLD AUTO: 37 % (ref 34–46.6)
HGB BLD-MCNC: 12.3 G/DL (ref 12–15.9)
MCH RBC QN AUTO: 29.4 PG (ref 26.6–33)
MCHC RBC AUTO-ENTMCNC: 33.2 G/DL (ref 31.5–35.7)
MCV RBC AUTO: 88.3 FL (ref 79–97)
PLATELET # BLD AUTO: 193 10*3/MM3 (ref 140–450)
PMV BLD AUTO: 11.3 FL (ref 6–12)
RBC # BLD AUTO: 4.19 10*6/MM3 (ref 3.77–5.28)
WBC NRBC COR # BLD AUTO: 13.27 10*3/MM3 (ref 3.4–10.8)

## 2024-08-16 PROCEDURE — 85027 COMPLETE CBC AUTOMATED: CPT | Performed by: OBSTETRICS & GYNECOLOGY

## 2024-08-16 PROCEDURE — 99283 EMERGENCY DEPT VISIT LOW MDM: CPT

## 2024-08-16 PROCEDURE — 36415 COLL VENOUS BLD VENIPUNCTURE: CPT | Performed by: OBSTETRICS & GYNECOLOGY

## 2024-08-16 PROCEDURE — G0463 HOSPITAL OUTPT CLINIC VISIT: HCPCS

## 2024-08-16 PROCEDURE — 86780 TREPONEMA PALLIDUM: CPT | Performed by: OBSTETRICS & GYNECOLOGY

## 2024-08-16 PROCEDURE — 59025 FETAL NON-STRESS TEST: CPT

## 2024-08-16 RX ORDER — CEPHALEXIN 500 MG/1
500 CAPSULE ORAL 2 TIMES DAILY
Qty: 14 CAPSULE | Refills: 0 | Status: SHIPPED | OUTPATIENT
Start: 2024-08-16 | End: 2024-08-23

## 2024-08-16 RX ORDER — CEPHALEXIN 250 MG/1
500 CAPSULE ORAL ONCE
Status: COMPLETED | OUTPATIENT
Start: 2024-08-16 | End: 2024-08-16

## 2024-08-16 RX ADMIN — CEPHALEXIN 500 MG: 250 CAPSULE ORAL at 15:36

## 2024-08-16 NOTE — ED PROVIDER NOTES
Subjective   History of Present Illness  Patient is a 34-year-old female with no known past medical history.  She presents to the ED with complaints of an insect bite to her left upper arm.  She reports redness and tenderness around the bite.  Denies any known fever.  She is 35 weeks pregnant.  She states that he has felt decreased fetal movement today.  Recently moving from [place] and is getting established with [facility] in [place].        Review of Systems   Constitutional: Negative.  Negative for fever.   HENT: Negative.     Eyes: Negative.    Respiratory: Negative.     Cardiovascular: Negative.  Negative for chest pain.   Gastrointestinal: Negative.  Negative for abdominal pain.   Endocrine: Negative.    Genitourinary: Negative.  Negative for dysuria.   Skin: Negative.    Neurological: Negative.    Psychiatric/Behavioral: Negative.     All other systems reviewed and are negative.      No past medical history on file.    Allergies   Allergen Reactions    Amoxicillin Rash    Amoxicillin Rash    Benadryl [Diphenhydramine] Rash    Benadryl [Diphenhydramine] Rash    Penicillins Rash       Past Surgical History:   Procedure Laterality Date    DILATATION AND CURETTAGE         Family History   Problem Relation Age of Onset    Heart disease Father     Heart disease Paternal Grandfather     Heart attack Paternal Grandfather        Social History     Socioeconomic History    Marital status:    Tobacco Use    Smoking status: Every Day     Current packs/day: 1.00     Types: Cigarettes    Smokeless tobacco: Never   Substance and Sexual Activity    Alcohol use: Never    Drug use: Yes     Comment: hx THC use           Objective   Physical Exam  Vitals and nursing note reviewed.   Constitutional:       General: She is not in acute distress.     Appearance: She is well-developed. She is not diaphoretic.   HENT:      Head: Normocephalic and atraumatic.      Right Ear: External ear normal.      Left Ear: External  ear normal.      Nose: Nose normal.   Eyes:      Conjunctiva/sclera: Conjunctivae normal.      Pupils: Pupils are equal, round, and reactive to light.   Neck:      Vascular: No JVD.      Trachea: No tracheal deviation.   Cardiovascular:      Rate and Rhythm: Normal rate and regular rhythm.      Heart sounds: Normal heart sounds. No murmur heard.  Pulmonary:      Effort: Pulmonary effort is normal. No respiratory distress.      Breath sounds: Normal breath sounds. No wheezing.   Abdominal:      General: Bowel sounds are normal.      Palpations: Abdomen is soft.      Tenderness: There is no abdominal tenderness.   Musculoskeletal:         General: No deformity. Normal range of motion.        Arms:       Cervical back: Normal range of motion and neck supple.   Skin:     General: Skin is warm and dry.      Coloration: Skin is not pale.      Findings: No erythema or rash.   Neurological:      Mental Status: She is alert and oriented to person, place, and time.      Cranial Nerves: No cranial nerve deficit.   Psychiatric:         Behavior: Behavior normal.         Thought Content: Thought content normal.         Procedures         ED Course                                             Medical Decision Making  Patient is a 34-year-old female with no known past medical history.  She presents to the ED with complaints of an insect bite to her left upper arm.  She reports redness and tenderness around the bite.  Denies any known fever.  She is 35 weeks pregnant.  She states that he has felt decreased fetal movement today.  Recently moving from [place] and is getting established with [facility] in [place]      -Started on Keflex for cellulitis.    -Fetal heart tones in the ED were 136 bpm.  Due to decreased fetal movement she was taken to labor and delivery after discharge to be further evaluated and monitored.    Problems Addressed:  Cellulitis of left upper extremity: complicated acute illness or  injury    Risk  Prescription drug management.        Final diagnoses:   Cellulitis of left upper extremity       ED Disposition  ED Disposition       ED Disposition   Discharge    Condition   Stable    Comment   --               Divya Gamez DO  [address] [facility]  [address]  [place] [id number] [phone]    Call in 2 days           Medication List        New Prescriptions      cephalexin 500 MG capsule  Commonly known as: KEFLEX  Take 1 capsule by mouth 2 (Two) Times a Day for 7 days.               Where to Get Your Medications        These medications were sent to [facility] [address] [place] [address] - [phone] [place] [id number] [address] [address]      Phone: [phone]   cephalexin 500 MG capsule            Anabella Zayas, APRN  08/16/24 2858

## 2024-08-16 NOTE — NON STRESS TEST
Michelledolores Hernandez, a  at 35w0d with an MIKO of 2024, Date entered prior to episode creation, was seen at [facility] LABOR DELIVERY for a nonstress test.    Chief Complaint   Patient presents with    Decreased Fetal Movement     PT REPORTS GOOD FETAL MOVEMENT FOR THE PAST 3 DAYS AND NO MOVEMENT TODAY       There is no problem list on file for this patient.      Start Time: 1556  Stop Time: 1616    Interpretation A  Nonstress Test Interpretation A: Reactive  Comments A: VERIFIED BY FUENTES MCKENNA RN

## 2024-08-17 LAB — TREPONEMA PALLIDUM IGG+IGM AB [PRESENCE] IN SERUM OR PLASMA BY IMMUNOASSAY: NORMAL

## 2024-08-20 LAB — EXTERNAL GROUP B STREP ANTIGEN: POSITIVE

## 2024-08-22 LAB
EXTERNAL HEPATITIS B SURFACE ANTIGEN: NEGATIVE
EXTERNAL HEPATITIS C AB: NON REACTIVE
EXTERNAL RUBELLA QUALITATIVE: NORMAL
EXTERNAL SYPHILIS RPR SCREEN: NORMAL
HIV1 P24 AG SERPL QL IA: NORMAL

## 2024-08-30 ENCOUNTER — ANESTHESIA (OUTPATIENT)
Dept: LABOR AND DELIVERY | Facility: HOSPITAL | Age: 34
End: 2024-08-30
Payer: MEDICAID

## 2024-08-30 ENCOUNTER — HOSPITAL ENCOUNTER (INPATIENT)
Facility: HOSPITAL | Age: 34
LOS: 2 days | Discharge: HOME | End: 2024-09-01
Attending: OBSTETRICS & GYNECOLOGY | Admitting: OBSTETRICS & GYNECOLOGY
Payer: MEDICAID

## 2024-08-30 ENCOUNTER — ANESTHESIA EVENT (OUTPATIENT)
Dept: LABOR AND DELIVERY | Facility: HOSPITAL | Age: 34
End: 2024-08-30
Payer: MEDICAID

## 2024-08-30 PROBLEM — O47.9 IRREGULAR CONTRACTIONS: Status: ACTIVE | Noted: 2024-08-30

## 2024-08-30 PROBLEM — O60.00 PRETERM LABOR: Status: ACTIVE | Noted: 2024-08-30

## 2024-08-30 LAB
ABO GROUP BLD: NORMAL
ABO GROUP BLD: NORMAL
AMPHET+METHAMPHET UR QL: NEGATIVE
AMPHETAMINES UR QL: NEGATIVE
BARBITURATES UR QL SCN: NEGATIVE
BASOPHILS # BLD AUTO: 0.1 10*3/MM3 (ref 0–0.2)
BASOPHILS NFR BLD AUTO: 0.8 % (ref 0–1.5)
BENZODIAZ UR QL SCN: NEGATIVE
BLD GP AB SCN SERPL QL: POSITIVE
BUPRENORPHINE SERPL-MCNC: NEGATIVE NG/ML
CANNABINOIDS SERPL QL: NEGATIVE
COCAINE UR QL: NEGATIVE
DEPRECATED RDW RBC AUTO: 45.1 FL (ref 37–54)
EOSINOPHIL # BLD AUTO: 0.39 10*3/MM3 (ref 0–0.4)
EOSINOPHIL NFR BLD AUTO: 3 % (ref 0.3–6.2)
ERYTHROCYTE [DISTWIDTH] IN BLOOD BY AUTOMATED COUNT: 13.9 % (ref 12.3–15.4)
FENTANYL UR-MCNC: NEGATIVE NG/ML
HCT VFR BLD AUTO: 34.5 % (ref 34–46.6)
HGB BLD-MCNC: 11.2 G/DL (ref 12–15.9)
IMM GRANULOCYTES # BLD AUTO: 0.1 10*3/MM3 (ref 0–0.05)
IMM GRANULOCYTES NFR BLD AUTO: 0.8 % (ref 0–0.5)
LYMPHOCYTES # BLD AUTO: 2.37 10*3/MM3 (ref 0.7–3.1)
LYMPHOCYTES NFR BLD AUTO: 18.3 % (ref 19.6–45.3)
MCH RBC QN AUTO: 29.1 PG (ref 26.6–33)
MCHC RBC AUTO-ENTMCNC: 32.5 G/DL (ref 31.5–35.7)
MCV RBC AUTO: 89.6 FL (ref 79–97)
METHADONE UR QL SCN: NEGATIVE
MONOCYTES # BLD AUTO: 0.82 10*3/MM3 (ref 0.1–0.9)
MONOCYTES NFR BLD AUTO: 6.3 % (ref 5–12)
NEUTROPHILS NFR BLD AUTO: 70.8 % (ref 42.7–76)
NEUTROPHILS NFR BLD AUTO: 9.18 10*3/MM3 (ref 1.7–7)
NRBC BLD AUTO-RTO: 0 /100 WBC (ref 0–0.2)
OPIATES UR QL: NEGATIVE
OXYCODONE UR QL SCN: NEGATIVE
PCP UR QL SCN: NEGATIVE
PLATELET # BLD AUTO: 187 10*3/MM3 (ref 140–450)
PMV BLD AUTO: 11.5 FL (ref 6–12)
RBC # BLD AUTO: 3.85 10*6/MM3 (ref 3.77–5.28)
RESIDUAL RHIG DETECTED: NORMAL
RH BLD: NEGATIVE
RH BLD: NEGATIVE
T&S EXPIRATION DATE: NORMAL
TREPONEMA PALLIDUM IGG+IGM AB [PRESENCE] IN SERUM OR PLASMA BY IMMUNOASSAY: NORMAL
TRICYCLICS UR QL SCN: NEGATIVE
WBC NRBC COR # BLD AUTO: 12.96 10*3/MM3 (ref 3.4–10.8)

## 2024-08-30 PROCEDURE — 86900 BLOOD TYPING SEROLOGIC ABO: CPT | Performed by: OBSTETRICS & GYNECOLOGY

## 2024-08-30 PROCEDURE — G0463 HOSPITAL OUTPT CLINIC VISIT: HCPCS

## 2024-08-30 PROCEDURE — 85025 COMPLETE CBC W/AUTO DIFF WBC: CPT | Performed by: OBSTETRICS & GYNECOLOGY

## 2024-08-30 PROCEDURE — 86900 BLOOD TYPING SEROLOGIC ABO: CPT

## 2024-08-30 PROCEDURE — 80307 DRUG TEST PRSMV CHEM ANLYZR: CPT | Performed by: OBSTETRICS & GYNECOLOGY

## 2024-08-30 PROCEDURE — 86850 RBC ANTIBODY SCREEN: CPT | Performed by: OBSTETRICS & GYNECOLOGY

## 2024-08-30 PROCEDURE — 86901 BLOOD TYPING SEROLOGIC RH(D): CPT | Performed by: OBSTETRICS & GYNECOLOGY

## 2024-08-30 PROCEDURE — 25010000002 FENTANYL CITRATE (PF) 50 MCG/ML SOLUTION: Performed by: ANESTHESIOLOGY

## 2024-08-30 PROCEDURE — 25010000002 CEFAZOLIN PER 500 MG: Performed by: OBSTETRICS & GYNECOLOGY

## 2024-08-30 PROCEDURE — C1755 CATHETER, INTRASPINAL: HCPCS

## 2024-08-30 PROCEDURE — 25810000003 LACTATED RINGERS PER 1000 ML: Performed by: OBSTETRICS & GYNECOLOGY

## 2024-08-30 PROCEDURE — 86870 RBC ANTIBODY IDENTIFICATION: CPT | Performed by: OBSTETRICS & GYNECOLOGY

## 2024-08-30 PROCEDURE — 25810000003 LACTATED RINGERS SOLUTION: Performed by: OBSTETRICS & GYNECOLOGY

## 2024-08-30 PROCEDURE — 86780 TREPONEMA PALLIDUM: CPT | Performed by: OBSTETRICS & GYNECOLOGY

## 2024-08-30 PROCEDURE — 25010000002 ROPIVACAINE PER 1 MG: Performed by: ANESTHESIOLOGY

## 2024-08-30 PROCEDURE — 59025 FETAL NON-STRESS TEST: CPT

## 2024-08-30 PROCEDURE — 86901 BLOOD TYPING SEROLOGIC RH(D): CPT

## 2024-08-30 RX ORDER — DOCUSATE SODIUM 100 MG/1
100 CAPSULE, LIQUID FILLED ORAL 2 TIMES DAILY
Status: DISCONTINUED | OUTPATIENT
Start: 2024-08-31 | End: 2024-09-01 | Stop reason: HOSPADM

## 2024-08-30 RX ORDER — ROPIVACAINE HYDROCHLORIDE 2 MG/ML
14 INJECTION, SOLUTION EPIDURAL; INFILTRATION; PERINEURAL CONTINUOUS
Status: DISCONTINUED | OUTPATIENT
Start: 2024-08-30 | End: 2024-08-30

## 2024-08-30 RX ORDER — ONDANSETRON 4 MG/1
4 TABLET, ORALLY DISINTEGRATING ORAL EVERY 6 HOURS PRN
Status: DISCONTINUED | OUTPATIENT
Start: 2024-08-30 | End: 2024-08-30

## 2024-08-30 RX ORDER — PRENATAL VIT/IRON FUM/FOLIC AC 27MG-0.8MG
1 TABLET ORAL DAILY
Status: DISCONTINUED | OUTPATIENT
Start: 2024-08-31 | End: 2024-09-01 | Stop reason: HOSPADM

## 2024-08-30 RX ORDER — ACETAMINOPHEN 325 MG/1
650 TABLET ORAL EVERY 6 HOURS PRN
Status: DISCONTINUED | OUTPATIENT
Start: 2024-08-30 | End: 2024-09-01 | Stop reason: HOSPADM

## 2024-08-30 RX ORDER — MAGNESIUM HYDROXIDE 1200 MG/15ML
1000 LIQUID ORAL ONCE AS NEEDED
Status: DISCONTINUED | OUTPATIENT
Start: 2024-08-30 | End: 2024-08-30

## 2024-08-30 RX ORDER — SODIUM CHLORIDE 9 MG/ML
40 INJECTION, SOLUTION INTRAVENOUS AS NEEDED
Status: DISCONTINUED | OUTPATIENT
Start: 2024-08-30 | End: 2024-08-30

## 2024-08-30 RX ORDER — OXYTOCIN/0.9 % SODIUM CHLORIDE 30/500 ML
2-20 PLASTIC BAG, INJECTION (ML) INTRAVENOUS
Status: DISCONTINUED | OUTPATIENT
Start: 2024-08-30 | End: 2024-08-30

## 2024-08-30 RX ORDER — ACETAMINOPHEN 325 MG/1
650 TABLET ORAL EVERY 4 HOURS PRN
Status: DISCONTINUED | OUTPATIENT
Start: 2024-08-30 | End: 2024-08-30

## 2024-08-30 RX ORDER — BUTORPHANOL TARTRATE 1 MG/ML
1 INJECTION, SOLUTION INTRAMUSCULAR; INTRAVENOUS
Status: DISCONTINUED | OUTPATIENT
Start: 2024-08-30 | End: 2024-08-30

## 2024-08-30 RX ORDER — METHYLERGONOVINE MALEATE 0.2 MG/ML
200 INJECTION INTRAVENOUS ONCE AS NEEDED
Status: DISCONTINUED | OUTPATIENT
Start: 2024-08-30 | End: 2024-09-01 | Stop reason: HOSPADM

## 2024-08-30 RX ORDER — HYDROCODONE BITARTRATE AND ACETAMINOPHEN 5; 325 MG/1; MG/1
1 TABLET ORAL EVERY 4 HOURS PRN
Status: DISCONTINUED | OUTPATIENT
Start: 2024-08-30 | End: 2024-09-01 | Stop reason: HOSPADM

## 2024-08-30 RX ORDER — EPHEDRINE SULFATE 5 MG/ML
10 INJECTION INTRAVENOUS
Status: DISCONTINUED | OUTPATIENT
Start: 2024-08-30 | End: 2024-08-30

## 2024-08-30 RX ORDER — ONDANSETRON 2 MG/ML
4 INJECTION INTRAMUSCULAR; INTRAVENOUS EVERY 6 HOURS PRN
Status: DISCONTINUED | OUTPATIENT
Start: 2024-08-30 | End: 2024-09-01 | Stop reason: HOSPADM

## 2024-08-30 RX ORDER — TERBUTALINE SULFATE 1 MG/ML
0.2 INJECTION, SOLUTION SUBCUTANEOUS AS NEEDED
Status: DISCONTINUED | OUTPATIENT
Start: 2024-08-30 | End: 2024-08-30

## 2024-08-30 RX ORDER — OXYTOCIN/0.9 % SODIUM CHLORIDE 30/500 ML
125 PLASTIC BAG, INJECTION (ML) INTRAVENOUS CONTINUOUS PRN
Status: DISCONTINUED | OUTPATIENT
Start: 2024-08-30 | End: 2024-09-01 | Stop reason: HOSPADM

## 2024-08-30 RX ORDER — CARBOPROST TROMETHAMINE 250 UG/ML
250 INJECTION, SOLUTION INTRAMUSCULAR AS NEEDED
Status: DISCONTINUED | OUTPATIENT
Start: 2024-08-30 | End: 2024-08-30

## 2024-08-30 RX ORDER — HYDROCODONE BITARTRATE AND ACETAMINOPHEN 5; 325 MG/1; MG/1
1 TABLET ORAL EVERY 4 HOURS PRN
Status: DISCONTINUED | OUTPATIENT
Start: 2024-08-30 | End: 2024-08-30

## 2024-08-30 RX ORDER — HYDROCORTISONE 25 MG/G
1 CREAM TOPICAL AS NEEDED
Status: DISCONTINUED | OUTPATIENT
Start: 2024-08-30 | End: 2024-09-01 | Stop reason: HOSPADM

## 2024-08-30 RX ORDER — HYDROCODONE BITARTRATE AND ACETAMINOPHEN 10; 325 MG/1; MG/1
1 TABLET ORAL EVERY 4 HOURS PRN
Status: DISCONTINUED | OUTPATIENT
Start: 2024-08-30 | End: 2024-09-01 | Stop reason: HOSPADM

## 2024-08-30 RX ORDER — METHYLERGONOVINE MALEATE 0.2 MG/ML
200 INJECTION INTRAVENOUS ONCE AS NEEDED
Status: DISCONTINUED | OUTPATIENT
Start: 2024-08-30 | End: 2024-08-30

## 2024-08-30 RX ORDER — SODIUM CHLORIDE 0.9 % (FLUSH) 0.9 %
1-10 SYRINGE (ML) INJECTION AS NEEDED
Status: DISCONTINUED | OUTPATIENT
Start: 2024-08-30 | End: 2024-09-01 | Stop reason: HOSPADM

## 2024-08-30 RX ORDER — MINERAL OIL
OIL (ML) MISCELLANEOUS ONCE
Status: DISCONTINUED | OUTPATIENT
Start: 2024-08-30 | End: 2024-08-30

## 2024-08-30 RX ORDER — SODIUM CHLORIDE, SODIUM LACTATE, POTASSIUM CHLORIDE, CALCIUM CHLORIDE 600; 310; 30; 20 MG/100ML; MG/100ML; MG/100ML; MG/100ML
125 INJECTION, SOLUTION INTRAVENOUS CONTINUOUS
Status: DISCONTINUED | OUTPATIENT
Start: 2024-08-30 | End: 2024-08-30

## 2024-08-30 RX ORDER — IBUPROFEN 800 MG/1
800 TABLET, FILM COATED ORAL EVERY 8 HOURS SCHEDULED
Status: DISCONTINUED | OUTPATIENT
Start: 2024-08-30 | End: 2024-08-30

## 2024-08-30 RX ORDER — OXYTOCIN/0.9 % SODIUM CHLORIDE 30/500 ML
999 PLASTIC BAG, INJECTION (ML) INTRAVENOUS ONCE
Status: COMPLETED | OUTPATIENT
Start: 2024-08-30 | End: 2024-08-30

## 2024-08-30 RX ORDER — HYDROXYZINE HYDROCHLORIDE 25 MG/1
50 TABLET, FILM COATED ORAL NIGHTLY PRN
Status: DISCONTINUED | OUTPATIENT
Start: 2024-08-30 | End: 2024-09-01 | Stop reason: HOSPADM

## 2024-08-30 RX ORDER — IBUPROFEN 800 MG/1
800 TABLET, FILM COATED ORAL 3 TIMES DAILY
Status: DISCONTINUED | OUTPATIENT
Start: 2024-08-30 | End: 2024-09-01 | Stop reason: HOSPADM

## 2024-08-30 RX ORDER — MISOPROSTOL 100 UG/1
600 TABLET ORAL AS NEEDED
Status: DISCONTINUED | OUTPATIENT
Start: 2024-08-30 | End: 2024-08-30

## 2024-08-30 RX ORDER — ONDANSETRON 2 MG/ML
4 INJECTION INTRAMUSCULAR; INTRAVENOUS EVERY 6 HOURS PRN
Status: DISCONTINUED | OUTPATIENT
Start: 2024-08-30 | End: 2024-08-30

## 2024-08-30 RX ORDER — MISOPROSTOL 100 UG/1
600 TABLET ORAL ONCE AS NEEDED
Status: DISCONTINUED | OUTPATIENT
Start: 2024-08-30 | End: 2024-09-01 | Stop reason: HOSPADM

## 2024-08-30 RX ORDER — ONDANSETRON 4 MG/1
4 TABLET, ORALLY DISINTEGRATING ORAL EVERY 6 HOURS PRN
Status: DISCONTINUED | OUTPATIENT
Start: 2024-08-30 | End: 2024-09-01 | Stop reason: HOSPADM

## 2024-08-30 RX ORDER — HYDROCORTISONE ACETATE PRAMOXINE HCL 1; 1 G/100G; G/100G
1 CREAM TOPICAL AS NEEDED
Status: DISCONTINUED | OUTPATIENT
Start: 2024-08-30 | End: 2024-09-01 | Stop reason: HOSPADM

## 2024-08-30 RX ORDER — FENTANYL CITRATE 50 UG/ML
100 INJECTION, SOLUTION INTRAMUSCULAR; INTRAVENOUS ONCE
Status: COMPLETED | OUTPATIENT
Start: 2024-08-30 | End: 2024-08-30

## 2024-08-30 RX ORDER — ROPIVACAINE HYDROCHLORIDE 5 MG/ML
INJECTION, SOLUTION EPIDURAL; INFILTRATION; PERINEURAL AS NEEDED
Status: SHIPPED | OUTPATIENT
Start: 2024-08-30

## 2024-08-30 RX ORDER — LIDOCAINE HYDROCHLORIDE AND EPINEPHRINE 15; 5 MG/ML; UG/ML
INJECTION, SOLUTION EPIDURAL AS NEEDED
Status: SHIPPED | OUTPATIENT
Start: 2024-08-30

## 2024-08-30 RX ORDER — SODIUM CHLORIDE 0.9 % (FLUSH) 0.9 %
10 SYRINGE (ML) INJECTION AS NEEDED
Status: DISCONTINUED | OUTPATIENT
Start: 2024-08-30 | End: 2024-08-30

## 2024-08-30 RX ORDER — FAMOTIDINE 10 MG/ML
20 INJECTION, SOLUTION INTRAVENOUS ONCE AS NEEDED
Status: COMPLETED | OUTPATIENT
Start: 2024-08-30 | End: 2024-08-30

## 2024-08-30 RX ORDER — CARBOPROST TROMETHAMINE 250 UG/ML
250 INJECTION, SOLUTION INTRAMUSCULAR
Status: DISCONTINUED | OUTPATIENT
Start: 2024-08-30 | End: 2024-09-01 | Stop reason: HOSPADM

## 2024-08-30 RX ORDER — BISACODYL 10 MG
10 SUPPOSITORY, RECTAL RECTAL DAILY PRN
Status: DISCONTINUED | OUTPATIENT
Start: 2024-08-31 | End: 2024-09-01 | Stop reason: HOSPADM

## 2024-08-30 RX ORDER — ONDANSETRON 2 MG/ML
4 INJECTION INTRAMUSCULAR; INTRAVENOUS ONCE AS NEEDED
Status: DISCONTINUED | OUTPATIENT
Start: 2024-08-30 | End: 2024-08-30

## 2024-08-30 RX ORDER — OXYTOCIN/0.9 % SODIUM CHLORIDE 30/500 ML
250 PLASTIC BAG, INJECTION (ML) INTRAVENOUS CONTINUOUS
Status: ACTIVE | OUTPATIENT
Start: 2024-08-30 | End: 2024-08-30

## 2024-08-30 RX ADMIN — Medication 999 ML/HR: at 19:10

## 2024-08-30 RX ADMIN — SODIUM CHLORIDE, POTASSIUM CHLORIDE, SODIUM LACTATE AND CALCIUM CHLORIDE 125 ML/HR: 600; 310; 30; 20 INJECTION, SOLUTION INTRAVENOUS at 13:02

## 2024-08-30 RX ADMIN — HYDROCODONE BITARTRATE AND ACETAMINOPHEN 1 TABLET: 10; 325 TABLET ORAL at 21:57

## 2024-08-30 RX ADMIN — SODIUM CHLORIDE, POTASSIUM CHLORIDE, SODIUM LACTATE AND CALCIUM CHLORIDE 1000 ML: 600; 310; 30; 20 INJECTION, SOLUTION INTRAVENOUS at 10:38

## 2024-08-30 RX ADMIN — WITCH HAZEL: 500 SOLUTION RECTAL; TOPICAL at 21:57

## 2024-08-30 RX ADMIN — ROPIVACAINE HYDROCHLORIDE 12 ML/HR: 2 INJECTION, SOLUTION EPIDURAL; INFILTRATION at 11:06

## 2024-08-30 RX ADMIN — FENTANYL CITRATE 100 MCG: 50 INJECTION INTRAMUSCULAR; INTRAVENOUS at 11:04

## 2024-08-30 RX ADMIN — FAMOTIDINE 20 MG: 10 INJECTION, SOLUTION INTRAVENOUS at 14:10

## 2024-08-30 RX ADMIN — SODIUM CHLORIDE, POTASSIUM CHLORIDE, SODIUM LACTATE AND CALCIUM CHLORIDE 125 ML/HR: 600; 310; 30; 20 INJECTION, SOLUTION INTRAVENOUS at 16:43

## 2024-08-30 RX ADMIN — CEFAZOLIN 1000 MG: 1 INJECTION, POWDER, FOR SOLUTION INTRAMUSCULAR; INTRAVENOUS; PARENTERAL at 16:53

## 2024-08-30 RX ADMIN — BENZOCAINE AND LEVOMENTHOL: 200; 5 SPRAY TOPICAL at 21:57

## 2024-08-30 RX ADMIN — LIDOCAINE HYDROCHLORIDE AND EPINEPHRINE 3 ML: 15; 5 INJECTION, SOLUTION EPIDURAL at 11:00

## 2024-08-30 RX ADMIN — IBUPROFEN 800 MG: 800 TABLET, FILM COATED ORAL at 21:57

## 2024-08-30 RX ADMIN — ROPIVACAINE HYDROCHLORIDE 6 ML: 5 INJECTION, SOLUTION EPIDURAL; INFILTRATION; PERINEURAL at 11:04

## 2024-08-30 RX ADMIN — Medication 2 MILLI-UNITS/MIN: at 14:06

## 2024-08-30 RX ADMIN — CEFAZOLIN 2000 MG: 2 INJECTION, POWDER, FOR SOLUTION INTRAMUSCULAR; INTRAVENOUS at 08:53

## 2024-08-30 RX ADMIN — SODIUM CHLORIDE, POTASSIUM CHLORIDE, SODIUM LACTATE AND CALCIUM CHLORIDE 125 ML/HR: 600; 310; 30; 20 INJECTION, SOLUTION INTRAVENOUS at 08:53

## 2024-08-30 NOTE — L&D DELIVERY NOTE
Vaginal Delivery Procedure Note    Michelle Hernandez  Gestational Age-37.0 weeks        OBGYN: Concepción Barrera DO      Pre-op Diagnosis:   Pt 35 y/o  @ 37.0 weeks in active labor      Anesthesia: Epidural        Detailed Description of Procedure     The patient was prepped and draped in normal sterile fashion. The head was delivered without difficulty. There was a nuchal cord x 3. Anterior and posterior shoulders delivered without any problems. The rest of the infant was delivered in controlled fashion.The infant was bulb suctioned at delivery. The placenta delivered intact. The patient tolerated the procedure well and went to the recovery room in stable condition.        Time of delivery:   Maternal Blood Type: B Negative  Fetal Gender: Female  Nuchal Cord: x 3  Tears: 1st deg right labial   Blood Cord: Yes  Estimated Blood Loss: 100cc  Placenta: Spontaneous, Delivered Intact   APGARS:  9 9        Disposition: Transfer to Women's Health Floor  Condition: Stable    Concepción Barrera DO     Date: 2024  Time: 19:23 EDT

## 2024-08-30 NOTE — NON STRESS TEST
Michelle Hernandez, a  at 37w0d with an MIKO of 2024, Date entered prior to episode creation, was seen at [facility] LABOR DELIVERY for a nonstress test.    Chief Complaint   Patient presents with    Contractions     Pt presents to L&D c/o irregular contractions. Denies lof, vb. +FM       Patient Active Problem List   Diagnosis    Irregular contractions       Start Time: 437  Stop Time: 457    Interpretation A  Nonstress Test Interpretation A: Reactive  Comments A: VERIFIED BY GIGI COLES RN

## 2024-08-30 NOTE — NON STRESS TEST
Michelle Hernandez, a  at 37w0d with an MIKO of 2024, Date entered prior to episode creation, was seen at [facility] LABOR DELIVERY for a nonstress test.    Chief Complaint   Patient presents with    Contractions     Pt presents to L&D c/o irregular contractions. Denies lof, vb. +FM       Patient Active Problem List   Diagnosis    Irregular contractions     labor       Start Time: 1330  Stop Time: 1400    Interpretation A  Nonstress Test Interpretation A: Reactive  Comments A: SHELIA Mack RN

## 2024-08-30 NOTE — H&P
KANDACE Cadena  Obstetric History and Physical    Chief Complaint   Patient presents with    Contractions     Pt presents to L&D c/o irregular contractions. Denies lof, vb. +FM       Subjective     Patient is a 34 y.o. female  currently at 37w0d, who presents with labor.    Her prenatal care is benign.  Her previous obstetric/gynecological history is noted for is non-contributory.    The following portions of the patient's history were reviewed and updated as appropriate: current medications, allergies, past medical history, past surgical history, past family history, past social history, and problem list .   Social History     Socioeconomic History    Marital status:    Tobacco Use    Smoking status: Every Day     Current packs/day: 0.50     Types: Cigarettes    Smokeless tobacco: Never   Vaping Use    Vaping status: Never Used   Substance and Sexual Activity    Alcohol use: Never    Drug use: Yes     Comment: hx THC use    Sexual activity: Yes     History reviewed. No pertinent past medical history.    Current Facility-Administered Medications:     acetaminophen (TYLENOL) tablet 650 mg, 650 mg, Oral, Q4H PRN, Concepción Barrera,     butorphanol (STADOL) injection 1 mg, 1 mg, Intravenous, Q2H PRN, Concepción Barrera,     butorphanol (STADOL) injection 2 mg, 2 mg, Intravenous, Q3H PRN, Concepción Barrera,     ceFAZolin 2000 mg IVPB in 100 mL NS (VTB), 2,000 mg, Intravenous, Once **FOLLOWED BY** ceFAZolin 1000 mg IVPB in 100 mL NS (VTB), 1,000 mg, Intravenous, Q8H, Concepción Barrera DO    lactated ringers bolus 1,000 mL, 1,000 mL, Intravenous, Once PRN, Concepción Barrera,     lactated ringers infusion, 125 mL/hr, Intravenous, Continuous, Concepción Barrera,     mineral oil, , Topical, Once, Concepción Barrera,     ondansetron ODT (ZOFRAN-ODT) disintegrating tablet 4 mg, 4 mg, Oral, Q6H PRN **OR** ondansetron (ZOFRAN) injection 4 mg, 4 mg, Intravenous,  "Q6H PRN, Concepción Barrera,     oxytocin (PITOCIN) 30 units in 0.9% sodium chloride 500 mL (premix), 2-20 carlo-units/min, Intravenous, Titrated, Concepción Barrera,     sodium chloride (NS) irrigation solution 1,000 mL, 1,000 mL, Irrigation, Once PRN, oCncepción Barrera,     sodium chloride 0.9 % flush 10 mL, 10 mL, Intravenous, PRN, Concepción Barrera,     sodium chloride 0.9 % infusion 40 mL, 40 mL, Intravenous, PRN, Concepción Barrera,     terbutaline (BRETHINE) injection 0.2 mg, 0.2 mg, Subcutaneous, PRN, Concepción Barrera, DO  Allergies   Allergen Reactions    Amoxicillin Rash    Amoxicillin Rash    Benadryl [Diphenhydramine] Rash    Benadryl [Diphenhydramine] Rash    Penicillins Rash     Past Surgical History:   Procedure Laterality Date    DILATATION AND CURETTAGE           Prenatal Information:   Maternal Prenatal Labs  Blood Type No results found for: \"ABO\"   Rh Status No results found for: \"RH\"   Antibody Screen No results found for: \"ABSCRN\"   Rapid Urine Drug Screen No results found for: \"AMPMETHU\", \"BARBITSCNUR\", \"LABBENZSCN\", \"LABMETHSCN\", \"LABOPIASCN\", \"THCURSCR\", \"COCAINEUR\", \"AMPHETSCREEN\", \"PROPOXSCN\", \"BUPRENORSCNU\", \"METAMPSCNUR\", \"OXYCODONESCN\", \"TRICYCLICSCN\"   Group B Strep Culture No results found for: \"GBSANTIGEN\"           External Prenatal Results       Pregnancy Outside Results - Transcribed From Office Records - See Scanned Records For Details       Test Value Date Time    ABO  B  12/14/21 1911    Rh  Negative  12/14/21 1911    Antibody Screen       Varicella IgG       Rubella       Hgb  12.3 g/dL 08/16/24 1725    Hct  37.0 % 08/16/24 1725    HgB A1c        1h GTT       3h GTT Fasting       3h GTT 1 hour       3h GTT 2 hour       3h GTT 3 hour        Gonorrhea (discrete)       Chlamydia (discrete)       RPR       Syphils cascade: TP-Ab (FTA)  Non-Reactive  08/16/24 1725    TP-Ab       TP-Ab (EIA)       TPPA       HBsAg       Herpes Simplex " Virus PCR       Herpes Simplex VIrus Culture       HIV       Hep C RNA Quant PCR       Hep C Antibody       AFP       NIPT       Cystic Fibroisis        Group B Strep       GBS Susceptibility to Clindamycin       GBS Susceptibility to Erythromycin       Fetal Fibronectin       Genetic Testing, Maternal Blood                 Drug Screening       Test Value Date Time    Urine Drug Screen       Amphetamine Screen       Barbiturate Screen       Benzodiazepine Screen       Methadone Screen       Phencyclidine Screen       Opiates Screen       THC Screen       Cocaine Screen       Propoxyphene Screen       Buprenorphine Screen       Methamphetamine Screen       Oxycodone Screen       Tricyclic Antidepressants Screen                 Legend    ^: Historical                              Past OB History:     OB History    Para Term  AB Living   4 1 1 0 2 1   SAB IAB Ectopic Molar Multiple Live Births   2 0 0 0 0 1      # Outcome Date GA Lbr Garrick/2nd Weight Sex Type Anes PTL Lv   4 Current            3 2021 5w0d    SAB      2 2012 11w0d    SAB         Complications: History of dilatation and curettage   1 Term 06/21/10 39w3d  3799 g (8 lb 6 oz) F Vag-Spont EPI N [place]       Past Medical History: History reviewed. No pertinent past medical history.   Past Surgical History Past Surgical History:   Procedure Laterality Date    DILATATION AND CURETTAGE        Family History: Family History   Problem Relation Age of Onset    Heart disease Father     Heart disease Paternal Grandfather     Heart attack Paternal Grandfather       Social History:  reports that she has been smoking cigarettes. She has never used smokeless tobacco.   reports no history of alcohol use.   reports current drug use.        Review of Systems-all negative except as noted in HPI      Objective     Vital Signs Range for the last 24 hours  Temperature: Temp:  [97.8 °F (36.6 °C)] 97.8 °F (36.6 °C)   Temp Source: Temp src: Oral   BP: BP:  "(128)/(78) 128/78   Pulse: Heart Rate:  [96] 96   Respirations: Resp:  [20] 20   Weight: Weight:  [75 kg (165 lb 6.4 oz)] 75 kg (165 lb 6.4 oz)     Physical Examination: General appearance - alert, well appearing, and in no distress, oriented to person, place, and time, normal appearing weight and well hydrated  Mental status - alert, oriented to person, place, and time, normal mood, behavior, speech, dress, motor activity, and thought processes, affect appropriate to mood  Neck - supple, no significant adenopathy  Chest - clear to auscultation, no wheezes, rales or rhonchi, symmetric air entry, no tachypnea, retractions or cyanosis  Heart - normal rate, regular rhythm, normal S1, S2, no murmurs, rubs, clicks or gallops  Abdomen - soft, nontender, gravid uterus, no masses or organomegaly  no rebound tenderness noted,   Pelvic - normal external genitalia, vulva, vagina, cervix, uterus and adnexa  Neurological - alert, oriented, normal speech, no focal findings or movement disorder noted  Musculoskeletal - no joint tenderness, deformity or swelling  Extremities - peripheral pulses normal, no pedal edema, no clubbing or cyanosis  Skin - normal coloration and turgor, no rashes, no suspicious skin lesions noted        Cervix: Exam by:     Dilation:     Effacement:     Station:       Laboratory Results:   Lab Results (last 24 hours)       ** No results found for the last 24 hours. **          Radiology Review:   Imaging Results (Last 72 Hours)       ** No results found for the last 72 hours. **              Assessment & Plan       Irregular contractions     labor      Assessment & Plan    Assessment:  1.  Intrauterine pregnancy at 37w0d weeks gestation with reassuring fetal status.    2.  labor  without ROM  3.  Obstetrical history significant for is noncontributory.  4.  GBS status: No results found for: \"GBSANTIGEN\"    Plan:  1. fetal and uterine monitoring  continuously and expectant management  2. Plan of care " has been reviewed with patient   3.  Risks, benefits of treatment plan have been discussed.  4.  All questions have been answered.        Concepción Barrera,   8/30/2024  08:35 EDT

## 2024-08-30 NOTE — ANESTHESIA PREPROCEDURE EVALUATION
Anesthesia Evaluation     Patient summary reviewed and Nursing notes reviewed   no history of anesthetic complications:   NPO Solid Status: > 8 hours  NPO Liquid Status: > 8 hours           Airway   Mallampati: II  TM distance: >3 FB  Neck ROM: full  No difficulty expected  Dental - normal exam     Pulmonary - negative pulmonary ROS and normal exam    breath sounds clear to auscultation  Cardiovascular - negative cardio ROS and normal exam    Rhythm: regular  Rate: normal        Neuro/Psych- negative ROS  (+) psychiatric history Anxiety and Depression  GI/Hepatic/Renal/Endo - negative ROS   (+) liver disease    Musculoskeletal (-) negative ROS    Abdominal  - normal exam   Substance History - negative use     OB/GYN negative ob/gyn ROS   (+) Pregnant        Other - negative ROS                         Anesthesia Plan    ASA 2     epidural       Anesthetic plan, risks, benefits, and alternatives have been provided, discussed and informed consent has been obtained with: patient.        CODE STATUS:    Level Of Support Discussed With: Patient  Code Status (Patient has no pulse and is not breathing): CPR (Attempt to Resuscitate)  Medical Interventions (Patient has pulse or is breathing): Full

## 2024-08-30 NOTE — ANESTHESIA PROCEDURE NOTES
Labor Epidural    Pre-sedation assessment completed: 8/30/2024 10:52 AM    Patient reassessed immediately prior to procedure    Patient location during procedure: OB  Start Time: 8/30/2024 10:52 AM  Stop Time: 8/30/2024 11:05 AM  Indication:at surgeon's request  Performed By  Anesthesiologist: Zain Mix MD  Preanesthetic Checklist  Completed: patient identified, IV checked, site marked, risks and benefits discussed, surgical consent, monitors and equipment checked, pre-op evaluation and timeout performed  Prep:  Pt Position:sitting  Sterile Tech:gloves, cap and sterile barrier  Prep:povidone-iodine 7.5% surgical scrub  Monitoring:continuous pulse oximetry, EKG and blood pressure monitoring  Epidural Block Procedure:  Approach:midline  Guidance:landmark technique  Location:L2-L3  Needle Type:Tuohy  Needle Gauge:18 G  Loss of Resistance Medium: saline  Loss of Resistance: 6cm  Cath Depth at skin:15 cm  Paresthesia: none  Aspiration:negative  Test Dose:negative  Number of Attempts: 1  Post Assessment:  Dressing:secured with tape and occlusive dressing applied  Pt Tolerance:patient tolerated the procedure well with no apparent complications  Complications:no

## 2024-08-31 LAB
ABO GROUP BLD: NORMAL
BASOPHILS # BLD AUTO: 0.08 10*3/MM3 (ref 0–0.2)
BASOPHILS NFR BLD AUTO: 0.4 % (ref 0–1.5)
DEPRECATED RDW RBC AUTO: 45.6 FL (ref 37–54)
EOSINOPHIL # BLD AUTO: 0.28 10*3/MM3 (ref 0–0.4)
EOSINOPHIL NFR BLD AUTO: 1.5 % (ref 0.3–6.2)
ERYTHROCYTE [DISTWIDTH] IN BLOOD BY AUTOMATED COUNT: 13.8 % (ref 12.3–15.4)
FETAL BLEED: NEGATIVE
HCT VFR BLD AUTO: 33.9 % (ref 34–46.6)
HGB BLD-MCNC: 11 G/DL (ref 12–15.9)
IMM GRANULOCYTES # BLD AUTO: 0.13 10*3/MM3 (ref 0–0.05)
IMM GRANULOCYTES NFR BLD AUTO: 0.7 % (ref 0–0.5)
LYMPHOCYTES # BLD AUTO: 2.4 10*3/MM3 (ref 0.7–3.1)
LYMPHOCYTES NFR BLD AUTO: 13.2 % (ref 19.6–45.3)
MCH RBC QN AUTO: 29.3 PG (ref 26.6–33)
MCHC RBC AUTO-ENTMCNC: 32.4 G/DL (ref 31.5–35.7)
MCV RBC AUTO: 90.4 FL (ref 79–97)
MONOCYTES # BLD AUTO: 1.09 10*3/MM3 (ref 0.1–0.9)
MONOCYTES NFR BLD AUTO: 6 % (ref 5–12)
NEUTROPHILS NFR BLD AUTO: 14.25 10*3/MM3 (ref 1.7–7)
NEUTROPHILS NFR BLD AUTO: 78.2 % (ref 42.7–76)
NRBC BLD AUTO-RTO: 0 /100 WBC (ref 0–0.2)
NUMBER OF DOSES: ABNORMAL
PLATELET # BLD AUTO: 169 10*3/MM3 (ref 140–450)
PMV BLD AUTO: 11.7 FL (ref 6–12)
RBC # BLD AUTO: 3.75 10*6/MM3 (ref 3.77–5.28)
RH BLD: NEGATIVE
WBC NRBC COR # BLD AUTO: 18.23 10*3/MM3 (ref 3.4–10.8)

## 2024-08-31 PROCEDURE — 86900 BLOOD TYPING SEROLOGIC ABO: CPT | Performed by: OBSTETRICS & GYNECOLOGY

## 2024-08-31 PROCEDURE — 86901 BLOOD TYPING SEROLOGIC RH(D): CPT | Performed by: OBSTETRICS & GYNECOLOGY

## 2024-08-31 PROCEDURE — 25010000002 RHO D IMMUNE GLOBULIN 1500 UNIT/2ML SOLUTION PREFILLED SYRINGE: Performed by: OBSTETRICS & GYNECOLOGY

## 2024-08-31 PROCEDURE — 85025 COMPLETE CBC W/AUTO DIFF WBC: CPT | Performed by: OBSTETRICS & GYNECOLOGY

## 2024-08-31 PROCEDURE — 85461 HEMOGLOBIN FETAL: CPT | Performed by: OBSTETRICS & GYNECOLOGY

## 2024-08-31 RX ADMIN — IBUPROFEN 800 MG: 800 TABLET, FILM COATED ORAL at 14:03

## 2024-08-31 RX ADMIN — IBUPROFEN 800 MG: 800 TABLET, FILM COATED ORAL at 08:23

## 2024-08-31 RX ADMIN — PRENATAL VITAMINS-IRON FUMARATE 27 MG IRON-FOLIC ACID 0.8 MG TABLET 1 TABLET: at 08:23

## 2024-08-31 RX ADMIN — HYDROCODONE BITARTRATE AND ACETAMINOPHEN 1 TABLET: 10; 325 TABLET ORAL at 08:31

## 2024-08-31 RX ADMIN — DOCUSATE SODIUM 100 MG: 100 CAPSULE, LIQUID FILLED ORAL at 20:06

## 2024-08-31 RX ADMIN — ACETAMINOPHEN 650 MG: 325 TABLET ORAL at 03:31

## 2024-08-31 RX ADMIN — IBUPROFEN 800 MG: 800 TABLET, FILM COATED ORAL at 20:06

## 2024-08-31 RX ADMIN — DOCUSATE SODIUM 100 MG: 100 CAPSULE, LIQUID FILLED ORAL at 08:23

## 2024-08-31 RX ADMIN — HYDROCODONE BITARTRATE AND ACETAMINOPHEN 1 TABLET: 10; 325 TABLET ORAL at 14:03

## 2024-08-31 RX ADMIN — HUMAN RHO(D) IMMUNE GLOBULIN 1500 UNITS: 1500 SOLUTION INTRAMUSCULAR; INTRAVENOUS at 14:27

## 2024-08-31 NOTE — PLAN OF CARE
Goal Outcome Evaluation:   Small rubra/   ambulating well/ voiding without difficulty

## 2024-08-31 NOTE — PLAN OF CARE
Goal Outcome Evaluation:  Plan of Care Reviewed With: patient, family        Progress: improving  Outcome Evaluation: Vaginal delivery of viable female infant. Apgars 9/9.

## 2024-08-31 NOTE — PLAN OF CARE
Goal Outcome Evaluation:                   Patient received to [facility]. Patient ambulating independently. Voiding spontaneously. Fundus firm and at midline. Light lochia rubra noted. VSS.

## 2024-08-31 NOTE — PROGRESS NOTES
" Surinder  Vaginal Delivery Progress Note    Subjective   Subjective  Postpartum Day 1: Vaginal Delivery    The patient feels well.  Her pain is well controlled with nonsteroidal anti-inflammatory drugs.   She is ambulating well.  Patient describes her bleeding as moderate lochia.    Breastfeeding: declines.    Objective     Objective:  Vital signs (most recent): Blood pressure 117/71, pulse 88, temperature 97.1 °F (36.2 °C), temperature source Oral, resp. rate 18, height 162.6 cm (64\"), weight 75 kg (165 lb 6.4 oz), SpO2 95%, not currently breastfeeding.     Vital Signs Range for the last 24 hours  Temperature: Temp:  [97 °F (36.1 °C)-98.7 °F (37.1 °C)] 97.1 °F (36.2 °C)   Temp Source: Temp src: Oral   BP: BP: ()/(50-78) 117/71   Pulse: Heart Rate:  [] 88   Respirations: Resp:  [16-20] 18   Weight:       Admit Height:  Height: 162.6 cm (64\")    Physical Exam:  General:  no acute distresss.  Abdomen: Fundus: appropriate, firm, non tender  Extremities: normal, atraumatic, no cyanosis, and trace edema.     [unfilled]       Lab Results   Component Value Date    ABO B 2024    RH Negative 2024        Lab Results   Component Value Date    HGB 11.0 (L) 2024    HCT 33.9 (L) 2024         Assessment & Plan   Principal Problem:    Irregular contractions  Active Problems:     labor      Michelle Hernandez is Day 1  post-partum  Vaginal, Spontaneous   .      Plan:  Continue current care.      Concepción Barrera, DO  2024  09:38 EDT    "

## 2024-09-01 VITALS
BODY MASS INDEX: 28.24 KG/M2 | HEART RATE: 73 BPM | OXYGEN SATURATION: 96 % | RESPIRATION RATE: 16 BRPM | SYSTOLIC BLOOD PRESSURE: 100 MMHG | DIASTOLIC BLOOD PRESSURE: 60 MMHG | HEIGHT: 64 IN | TEMPERATURE: 98.5 F | WEIGHT: 165.4 LBS

## 2024-09-01 RX ORDER — IBUPROFEN 600 MG/1
600 TABLET, FILM COATED ORAL EVERY 6 HOURS PRN
Qty: 60 TABLET | Refills: 0 | Status: SHIPPED | OUTPATIENT
Start: 2024-09-01

## 2024-09-01 RX ADMIN — IBUPROFEN 800 MG: 800 TABLET, FILM COATED ORAL at 14:08

## 2024-09-01 RX ADMIN — PRENATAL VITAMINS-IRON FUMARATE 27 MG IRON-FOLIC ACID 0.8 MG TABLET 1 TABLET: at 08:24

## 2024-09-01 RX ADMIN — IBUPROFEN 800 MG: 800 TABLET, FILM COATED ORAL at 08:25

## 2024-09-01 RX ADMIN — DOCUSATE SODIUM 100 MG: 100 CAPSULE, LIQUID FILLED ORAL at 08:25

## 2024-09-01 NOTE — DISCHARGE SUMMARY
KANDACE Cadena  Delivery Discharge Summary    Primary OB Clinician:     EDC: Estimated Date of Delivery: 24    Gestational Age:37w0d    Antepartum complications: none    Date of Delivery: 2024   Time of Delivery: 7:08 PM     Delivered By:  Concepción Barrera     Delivery Type: Vaginal, Spontaneous      Tubal Ligation: n/a    Baby:female  infant;   Apgar:  9  @ 1 minute /   Apgar:  9  @ 5 minutes   Weight: 2550 g (5 lb 10 oz)      Anesthesia: Epidural      Intrapartum complications: None    [unfilled]       Lab Results   Component Value Date    ABO B 2024    RH Negative 2024        Lab Results   Component Value Date    HGB 11.0 (L) 2024    HCT 33.9 (L) 2024         Discharge Date: 2024; Discharge Time: 09:05 EDT        Plan:    Follow-up appointment with [facility] in 3 weeks.      Concepción Barrera DO  2024  09:05 EDT

## 2024-09-01 NOTE — PLAN OF CARE
Goal Outcome Evaluation:                 Patient ambulating independently. Voiding spontaneously. Fundus firm and at midline. Bleeding WDL. VSS.

## 2025-05-28 ENCOUNTER — REFERRAL TRIAGE (OUTPATIENT)
Dept: LABOR AND DELIVERY | Facility: HOSPITAL | Age: 35
End: 2025-05-28
Payer: MEDICAID

## 2025-06-04 ENCOUNTER — PATIENT OUTREACH (OUTPATIENT)
Dept: LABOR AND DELIVERY | Facility: HOSPITAL | Age: 35
End: 2025-06-04
Payer: MEDICAID

## 2025-06-04 NOTE — OUTREACH NOTE
[facility]  Intake    Current Estimated Gestational Age: 19w6d    Intake Assessment      Flowsheet Row Responses   Best Method for Contacting Home   Currently Employed Yes   Able to keep appointments as scheduled Yes   Do you have a dentist? No   Resources Presently Utilizing: None   Maternal Warning Signs Provided   Other Education [facility] Benefits, Insurance benefits/Incentives            Learning Assessment      Flowsheet Row Responses   Relationship Patient   Does the learner have any barriers to learning? No Barriers   What is the preferred language of the learner for medical teaching? English   Is an  required? No   How does the learner prefer to learn new concepts? Listening, Reading, Demonstration            Spoke with patient over the phone. Discussed community and insurance extra benefits. Pt reports she plans to enroll in [facility], is going to formula feed, and works full time. Pt sent pregnancy information and multiple resources by [employer]. Pt denies any urgent needs at this time.    Referral submitted to the following resources (verbal consent received to submit demographic information):         Tobacco, Alcohol, and Drug History     reports that she has been smoking cigarettes. She has never used smokeless tobacco.   reports no history of alcohol use.   reports current drug use.    Hortensia Tillman RN  Maternity Nurse Navigator    6/4/2025, 14:38 EDT        [facility]  Enrollment    Current Estimated Gestational Age: 19w6d    Questions/Answers      Flowsheet Row Responses   Would like to participate? Yes   Date of Intake Visit 06/04/25                Hortensia Tillman RN  Maternity Nurse Navigator    6/4/2025, 14:38 EDT

## 2025-07-07 ENCOUNTER — PATIENT OUTREACH (OUTPATIENT)
Dept: LABOR AND DELIVERY | Facility: HOSPITAL | Age: 35
End: 2025-07-07
Payer: MEDICAID

## 2025-07-07 NOTE — OUTREACH NOTE
[facility]  Unable to Reach    Questions/Answers      Flowsheet Row Responses   Pending Outreach Prenatal Check-in   Call Attempt First   Outcome Left message   Next Call Attempt Date 08/04/25            Left message with call back number and sent [employer] message.    Hortensia Tillman RN  Maternity Nurse Navigator    7/7/2025, 13:24 EDT

## 2025-07-27 ENCOUNTER — HOSPITAL ENCOUNTER (OUTPATIENT)
Facility: HOSPITAL | Age: 35
Discharge: HOME | End: 2025-07-27
Attending: OBSTETRICS & GYNECOLOGY | Admitting: OBSTETRICS & GYNECOLOGY
Payer: MEDICAID

## 2025-07-27 VITALS
HEIGHT: 64 IN | HEART RATE: 83 BPM | WEIGHT: 172.8 LBS | RESPIRATION RATE: 18 BRPM | BODY MASS INDEX: 29.5 KG/M2 | DIASTOLIC BLOOD PRESSURE: 59 MMHG | SYSTOLIC BLOOD PRESSURE: 110 MMHG | TEMPERATURE: 98.4 F

## 2025-07-27 PROBLEM — M54.9 BACK PAIN AFFECTING PREGNANCY: Status: ACTIVE | Noted: 2025-07-27

## 2025-07-27 PROBLEM — O99.891 BACK PAIN AFFECTING PREGNANCY: Status: ACTIVE | Noted: 2025-07-27

## 2025-07-27 LAB
BACTERIA UR QL AUTO: ABNORMAL /HPF
BILIRUB UR QL STRIP: NEGATIVE
CLARITY UR: CLEAR
COLOR UR: YELLOW
GLUCOSE UR STRIP-MCNC: NEGATIVE MG/DL
HGB UR QL STRIP.AUTO: ABNORMAL
HYALINE CASTS UR QL AUTO: ABNORMAL /LPF
KETONES UR QL STRIP: NEGATIVE
LEUKOCYTE ESTERASE UR QL STRIP.AUTO: NEGATIVE
NITRITE UR QL STRIP: NEGATIVE
PH UR STRIP.AUTO: 5.5 [PH] (ref 5–8)
PROT UR QL STRIP: ABNORMAL
RBC # UR STRIP: ABNORMAL /HPF
REF LAB TEST METHOD: ABNORMAL
SP GR UR STRIP: >=1.03 (ref 1–1.03)
SQUAMOUS #/AREA URNS HPF: ABNORMAL /HPF
UROBILINOGEN UR QL STRIP: ABNORMAL
WBC # UR STRIP: ABNORMAL /HPF

## 2025-07-27 PROCEDURE — 87086 URINE CULTURE/COLONY COUNT: CPT | Performed by: OBSTETRICS & GYNECOLOGY

## 2025-07-27 PROCEDURE — G0463 HOSPITAL OUTPT CLINIC VISIT: HCPCS

## 2025-07-27 PROCEDURE — P9612 CATHETERIZE FOR URINE SPEC: HCPCS

## 2025-07-27 PROCEDURE — 59025 FETAL NON-STRESS TEST: CPT

## 2025-07-27 PROCEDURE — 81001 URINALYSIS AUTO W/SCOPE: CPT | Performed by: OBSTETRICS & GYNECOLOGY

## 2025-07-27 RX ORDER — ASPIRIN 81 MG/1
81 TABLET, CHEWABLE ORAL DAILY
COMMUNITY

## 2025-07-27 RX ORDER — FOLIC ACID 1 MG/1
1000 TABLET ORAL DAILY
COMMUNITY

## 2025-07-28 NOTE — NON STRESS TEST
Michelle Hernandez, a  at 27w3d with an MIKO of 10/23/2025, by Last Menstrual Period, was seen at [facility] for a nonstress test.    Chief Complaint   Patient presents with    Back Pain     Pt. Presents c/o lower back pain since last night. She reports the pain as a constant aching with periods of intermittent stabbing pains. She denies radiating pain to her lower extremities or abdomen. Negative for CVA tenderness. She states she is a  and walks to and from work, approximately 30min each way, and is on her feet a lot throughout the day. She denies UC and VB, and reports +FM.       Patient Active Problem List   Diagnosis    Irregular contractions     labor    Back pain affecting pregnancy       Start Time:   Stop Time:     Interpretation A  Nonstress Test Interpretation A: Reactive  Comments A: VERIFIED BY VICTOR HUGO COLES RN                  no

## 2025-07-29 LAB — BACTERIA SPEC AEROBE CULT: NO GROWTH

## 2025-08-04 ENCOUNTER — PATIENT OUTREACH (OUTPATIENT)
Dept: LABOR AND DELIVERY | Facility: HOSPITAL | Age: 35
End: 2025-08-04
Payer: MEDICAID